# Patient Record
Sex: MALE | Race: ASIAN | NOT HISPANIC OR LATINO | Employment: FULL TIME | URBAN - METROPOLITAN AREA
[De-identification: names, ages, dates, MRNs, and addresses within clinical notes are randomized per-mention and may not be internally consistent; named-entity substitution may affect disease eponyms.]

---

## 2022-07-01 ENCOUNTER — NURSE TRIAGE (OUTPATIENT)
Dept: ADMINISTRATIVE | Facility: CLINIC | Age: 27
End: 2022-07-01
Payer: COMMERCIAL

## 2022-07-01 ENCOUNTER — HOSPITAL ENCOUNTER (EMERGENCY)
Facility: OTHER | Age: 27
Discharge: HOME | End: 2022-07-01
Attending: EMERGENCY MEDICINE
Payer: COMMERCIAL

## 2022-07-01 VITALS
BODY MASS INDEX: 25.77 KG/M2 | DIASTOLIC BLOOD PRESSURE: 70 MMHG | HEART RATE: 81 BPM | TEMPERATURE: 98 F | SYSTOLIC BLOOD PRESSURE: 125 MMHG | OXYGEN SATURATION: 99 % | HEIGHT: 70 IN | WEIGHT: 180 LBS | RESPIRATION RATE: 18 BRPM

## 2022-07-01 DIAGNOSIS — K61.0 PERIANAL ABSCESS: Primary | ICD-10-CM

## 2022-07-01 LAB
ALBUMIN SERPL BCP-MCNC: 3.9 G/DL (ref 3.5–5.2)
ALP SERPL-CCNC: 85 U/L (ref 55–135)
ALT SERPL W/O P-5'-P-CCNC: 16 U/L (ref 10–44)
ANION GAP SERPL CALC-SCNC: 10 MMOL/L (ref 8–16)
AST SERPL-CCNC: 20 U/L (ref 10–40)
BASOPHILS # BLD AUTO: 0.04 K/UL (ref 0–0.2)
BASOPHILS NFR BLD: 0.5 % (ref 0–1.9)
BILIRUB SERPL-MCNC: 0.5 MG/DL (ref 0.1–1)
BUN SERPL-MCNC: 7 MG/DL (ref 6–20)
CALCIUM SERPL-MCNC: 9.1 MG/DL (ref 8.7–10.5)
CHLORIDE SERPL-SCNC: 102 MMOL/L (ref 95–110)
CO2 SERPL-SCNC: 27 MMOL/L (ref 23–29)
CREAT SERPL-MCNC: 1.1 MG/DL (ref 0.5–1.4)
DIFFERENTIAL METHOD: NORMAL
EOSINOPHIL # BLD AUTO: 0.1 K/UL (ref 0–0.5)
EOSINOPHIL NFR BLD: 1.2 % (ref 0–8)
ERYTHROCYTE [DISTWIDTH] IN BLOOD BY AUTOMATED COUNT: 14 % (ref 11.5–14.5)
EST. GFR  (AFRICAN AMERICAN): >60 ML/MIN/1.73 M^2
EST. GFR  (NON AFRICAN AMERICAN): >60 ML/MIN/1.73 M^2
GLUCOSE SERPL-MCNC: 88 MG/DL (ref 70–110)
HCT VFR BLD AUTO: 43.3 % (ref 40–54)
HCV AB SERPL QL IA: NEGATIVE
HGB BLD-MCNC: 14.2 G/DL (ref 14–18)
HIV 1+2 AB+HIV1 P24 AG SERPL QL IA: NEGATIVE
IMM GRANULOCYTES # BLD AUTO: 0.02 K/UL (ref 0–0.04)
IMM GRANULOCYTES NFR BLD AUTO: 0.3 % (ref 0–0.5)
LACTATE SERPL-SCNC: 1.1 MMOL/L (ref 0.5–2.2)
LYMPHOCYTES # BLD AUTO: 1.4 K/UL (ref 1–4.8)
LYMPHOCYTES NFR BLD: 19.2 % (ref 18–48)
MCH RBC QN AUTO: 27.8 PG (ref 27–31)
MCHC RBC AUTO-ENTMCNC: 32.8 G/DL (ref 32–36)
MCV RBC AUTO: 85 FL (ref 82–98)
MONOCYTES # BLD AUTO: 0.8 K/UL (ref 0.3–1)
MONOCYTES NFR BLD: 10.6 % (ref 4–15)
NEUTROPHILS # BLD AUTO: 5 K/UL (ref 1.8–7.7)
NEUTROPHILS NFR BLD: 68.2 % (ref 38–73)
NRBC BLD-RTO: 0 /100 WBC
PLATELET # BLD AUTO: 293 K/UL (ref 150–450)
PMV BLD AUTO: 9.6 FL (ref 9.2–12.9)
POTASSIUM SERPL-SCNC: 4 MMOL/L (ref 3.5–5.1)
PROT SERPL-MCNC: 8.1 G/DL (ref 6–8.4)
RBC # BLD AUTO: 5.1 M/UL (ref 4.6–6.2)
SODIUM SERPL-SCNC: 139 MMOL/L (ref 136–145)
WBC # BLD AUTO: 7.28 K/UL (ref 3.9–12.7)

## 2022-07-01 PROCEDURE — 25000003 PHARM REV CODE 250: Performed by: STUDENT IN AN ORGANIZED HEALTH CARE EDUCATION/TRAINING PROGRAM

## 2022-07-01 PROCEDURE — 99285 EMERGENCY DEPT VISIT HI MDM: CPT | Mod: 25

## 2022-07-01 PROCEDURE — 80053 COMPREHEN METABOLIC PANEL: CPT | Performed by: STUDENT IN AN ORGANIZED HEALTH CARE EDUCATION/TRAINING PROGRAM

## 2022-07-01 PROCEDURE — 85025 COMPLETE CBC W/AUTO DIFF WBC: CPT | Performed by: STUDENT IN AN ORGANIZED HEALTH CARE EDUCATION/TRAINING PROGRAM

## 2022-07-01 PROCEDURE — 86803 HEPATITIS C AB TEST: CPT | Performed by: EMERGENCY MEDICINE

## 2022-07-01 PROCEDURE — 87389 HIV-1 AG W/HIV-1&-2 AB AG IA: CPT | Performed by: EMERGENCY MEDICINE

## 2022-07-01 PROCEDURE — 46050 I&D PERIANAL ABSCESS SUPFC: CPT

## 2022-07-01 PROCEDURE — 25500020 PHARM REV CODE 255: Performed by: EMERGENCY MEDICINE

## 2022-07-01 PROCEDURE — 83605 ASSAY OF LACTIC ACID: CPT | Performed by: STUDENT IN AN ORGANIZED HEALTH CARE EDUCATION/TRAINING PROGRAM

## 2022-07-01 RX ORDER — ACETAMINOPHEN 325 MG/1
650 TABLET ORAL
Status: COMPLETED | OUTPATIENT
Start: 2022-07-01 | End: 2022-07-01

## 2022-07-01 RX ORDER — IBUPROFEN 600 MG/1
600 TABLET ORAL
Status: COMPLETED | OUTPATIENT
Start: 2022-07-01 | End: 2022-07-01

## 2022-07-01 RX ORDER — AMOXICILLIN AND CLAVULANATE POTASSIUM 875; 125 MG/1; MG/1
1 TABLET, FILM COATED ORAL 2 TIMES DAILY
Qty: 9 TABLET | Refills: 0 | Status: ON HOLD | OUTPATIENT
Start: 2022-07-01 | End: 2023-03-21 | Stop reason: HOSPADM

## 2022-07-01 RX ORDER — AMOXICILLIN AND CLAVULANATE POTASSIUM 875; 125 MG/1; MG/1
1 TABLET, FILM COATED ORAL
Status: COMPLETED | OUTPATIENT
Start: 2022-07-01 | End: 2022-07-01

## 2022-07-01 RX ORDER — AMOXICILLIN AND CLAVULANATE POTASSIUM 875; 125 MG/1; MG/1
1 TABLET, FILM COATED ORAL 2 TIMES DAILY
Qty: 9 TABLET | Refills: 0 | Status: CANCELLED | OUTPATIENT
Start: 2022-07-01

## 2022-07-01 RX ORDER — AMOXICILLIN AND CLAVULANATE POTASSIUM 875; 125 MG/1; MG/1
1 TABLET, FILM COATED ORAL 2 TIMES DAILY
Qty: 9 TABLET | Refills: 0 | Status: SHIPPED | OUTPATIENT
Start: 2022-07-01 | End: 2022-07-01 | Stop reason: SDUPTHER

## 2022-07-01 RX ORDER — LIDOCAINE HYDROCHLORIDE AND EPINEPHRINE 10; 10 MG/ML; UG/ML
10 INJECTION, SOLUTION INFILTRATION; PERINEURAL ONCE
Status: COMPLETED | OUTPATIENT
Start: 2022-07-01 | End: 2022-07-01

## 2022-07-01 RX ADMIN — IBUPROFEN 600 MG: 600 TABLET ORAL at 05:07

## 2022-07-01 RX ADMIN — LIDOCAINE HYDROCHLORIDE AND EPINEPHRINE 10 ML: 10; 10 INJECTION, SOLUTION INFILTRATION; PERINEURAL at 05:07

## 2022-07-01 RX ADMIN — ACETAMINOPHEN 650 MG: 325 TABLET, FILM COATED ORAL at 05:07

## 2022-07-01 RX ADMIN — IOHEXOL 100 ML: 350 INJECTION, SOLUTION INTRAVENOUS at 03:07

## 2022-07-01 RX ADMIN — AMOXICILLIN AND CLAVULANATE POTASSIUM 1 TABLET: 875; 125 TABLET, FILM COATED ORAL at 05:07

## 2022-07-01 NOTE — ED TRIAGE NOTES
Pt presents to the ED w/ c/o perianal abscess x 2 months. Pt states he went to urgent care prior to ED arrival and was told to come to ED to drain site. Pt states abscess has grown larger over 2 months and is more painful. Pt states he has been taking sitz baths and has noticed light drainage of pus from site.

## 2022-07-01 NOTE — ED PROVIDER NOTES
Encounter Date: 7/1/2022       History     Chief Complaint   Patient presents with    Abscess     Pt came to the ed for a perianal abscess, went to  where he was diagnosed     26-year-old male with no past medical history who reports to ED with complaint of perianal abscess.  He states it is for started 2 months ago and has progressively grown larger and more painful.  He states he has been using Sitz bath and preparation H with little relief.  He reports that he is having regular bowel movements.  He believes he may have had a past perianal abscess which resolved spontaneously.  He denies fevers, chills, chest pain, shortness of breath, cough, abdominal pain, dysuria, diarrhea, constipation or any other since he had symptoms.  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         Review of patient's allergies indicates:  No Known Allergies  No past medical history on file.  No past surgical history on file.  No family history on file.     Review of Systems   Constitutional: Negative for chills and fever.   HENT: Negative for congestion and rhinorrhea.    Eyes: Negative for pain and visual disturbance.   Respiratory: Negative for cough and shortness of breath.    Cardiovascular: Negative for chest pain and palpitations.   Gastrointestinal: Negative for abdominal pain, nausea and vomiting.   Genitourinary: Negative for difficulty urinating and dysuria.        Perianal pain with mass   Musculoskeletal: Negative for back pain and myalgias.   Skin: Negative for pallor and rash.   Neurological: Negative for weakness, light-headedness, numbness and headaches.       Physical Exam     Initial Vitals [07/01/22 1320]   BP Pulse Resp Temp SpO2   122/62 79 16 98.1 °F (36.7 °C) 100 %      MAP       --         Physical Exam    Nursing note and vitals reviewed.  Constitutional: He appears well-developed. He is not diaphoretic. No distress.   HENT:   Head: Normocephalic and  atraumatic.   Eyes: Conjunctivae and EOM are normal.   Neck: Neck supple.   Normal range of motion.  Cardiovascular: Normal rate, regular rhythm, normal heart sounds and intact distal pulses.   Pulmonary/Chest: Breath sounds normal. No respiratory distress.   Abdominal: Abdomen is soft. He exhibits no distension. There is no abdominal tenderness.   Musculoskeletal:         General: No tenderness or edema. Normal range of motion.      Cervical back: Normal range of motion and neck supple.     Neurological: He is alert and oriented to person, place, and time. He has normal strength. No sensory deficit.   Skin: Skin is warm and dry. Capillary refill takes less than 2 seconds.   Approximately 3 cm mildly erythematous fluctuant mass with minimal induration on left buttock inferior to level of anus         ED Course   I & D - Incision and Drainage    Date/Time: 7/1/2022 5:55 PM  Location procedure was performed: [facility] EMERGENCY DEPARTMENT  Performed by: Mary Goncalves MD  Authorized by: Clayton Colmenares MD   Consent Done: Yes  Consent: Verbal consent obtained.  Risks and benefits: risks, benefits and alternatives were discussed  Type: abscess  Anesthesia: local infiltration    Anesthesia:  Local Anesthetic: lidocaine 1% with epinephrine  Anesthetic total: 10 mL  Scalpel size: 11  Incision type: single straight  Complexity: simple  Drainage: serous  Drainage amount: moderate  Wound treatment: wound packed  Packing material: 1/2 in gauze  Complications: No  Patient tolerance: Patient tolerated the procedure well with no immediate complications        Labs Reviewed   HIV 1 / 2 ANTIBODY    Narrative:     Release to patient->Immediate   HEPATITIS C ANTIBODY    Narrative:     Release to patient->Immediate   CBC W/ AUTO DIFFERENTIAL   COMPREHENSIVE METABOLIC PANEL   LACTIC ACID, PLASMA          Imaging Results           CT Pelvis With Contrast (Final result)  Result time 07/01/22 16:47:30    Final result by Mic Andrea MD  (07/01/22 16:47:30)                 Impression:      3.2 x 0.8 cm rim enhancing collection in the left medial cleft at the 6 o'clock position, consistent with a small perirectal abscess.  No intrapelvic extension.    Distention of the urinary bladder.  This is most likely reactive.    This report was flagged in Epic as abnormal.      Electronically signed by: Mic Andrea MD  Date:    07/01/2022  Time:    16:47             Narrative:    EXAMINATION:  CT PELVIS WITH  CONTRAST    CLINICAL HISTORY:  Anal/rectal abscess;    TECHNIQUE:  Axial CT scan of the pelvis was performed after intravenous administration of 100 cc of Omnipaque 350 IV.  Coronal and sagittal reformats were obtained.    COMPARISON:  None    FINDINGS:  The iliac vessels are unremarkable.  There are multiple enlarged bilateral inguinal lymph nodes.    The visualized bowel loops are unremarkable.  The appendix is fluid filled with no significant periappendiceal inflammatory changes.    The urinary bladder is distended.  There are bilateral scrotal hydroceles.    Within the left medial cleft at the 6 o'clock position, there is a 3.2 x 0.8 cm rim enhancing collection.  No intrapelvic extension is identified.    The osseous structures are unremarkable.                                 Medications   iohexoL (OMNIPAQUE 350) injection 100 mL (100 mLs Intravenous Given 7/1/22 1549)   LIDOcaine-EPINEPHrine 1%-1:100,000 injection 10 mL (10 mLs Intradermal Given by Provider 7/1/22 1708)   amoxicillin-clavulanate 875-125mg per tablet 1 tablet (1 tablet Oral Given 7/1/22 1716)   acetaminophen tablet 650 mg (650 mg Oral Given 7/1/22 1751)   ibuprofen tablet 600 mg (600 mg Oral Given 7/1/22 1751)     Medical Decision Making:   Initial Assessment:   26-year-old male with no past medical history who reports to ED with complaint of perianal abscess.    Differential Diagnosis:   Abscess  Cyst  Fistula  Clinical Tests:   Lab Tests: Ordered and Reviewed  Radiological Study:  Ordered and Reviewed  ED Management:  Patient is hemodynamically stable.  He is nontoxic appearing.  Bedside ultrasound of perianal mass shows a demarcated echogenic region that does not appear to contain any drainable fluid pocket.  Due to proximity to perineum, CT obtained.  CBC, CMP and lactic acid are within normal limits.  CT pelvis demonstrates 3.2 x 0.8 cm rim enhancing collection in the left medial cleft at the 6 o'clock position, consistent with a small perirectal abscess.  No evidence of intrapelvic extension.  Bedside incision and drainage performed without any immediate complications.  Patient given Tylenol and ibuprofen for pain.  Prescription for Augmentin given with first dose administered in the ED.  Patient counseled on proper wound care and follow-up.  Patient verbalized understanding and agreement with plan.  Patient discharged home with return precautions.  All questions answered.                      Clinical Impression:   Final diagnoses:  [K61.0] Perianal abscess (Primary)          ED Disposition Condition    Discharge Stable        ED Prescriptions     Medication Sig Dispense Start Date End Date Auth. Provider    amoxicillin-clavulanate 875-125mg (AUGMENTIN) 875-125 mg per tablet (Expires today) Take 1 tablet by mouth 2 (two) times daily. 9 tablet 7/1/2022 7/1/2022 Mary Goncalves MD        Follow-up Information    None          Mray Goncalves MD  Resident  07/01/22 [id number]

## 2022-07-01 NOTE — DISCHARGE INSTRUCTIONS
Diagnosis:   1. Perianal abscess      Home Care Instructions:  - Medications: Take Augmentin twice daily for 5 days.     Follow-Up Plan:  - Follow-up with: Primary care doctor within 24-48 hours to have packing removes.   - Additional testing and/or evaluation will be directed by your primary doctor    Return to the Emergency Department for symptoms including but not limited to: worsening symptoms, severe back pain, shortness of breath or chest pain, vomiting with inability to hold down fluids, blood in vomit or poop, fevers greater than 100.4°F, passing out/fainting/unconsciousness, or other concerning symptoms.

## 2022-07-02 NOTE — TELEPHONE ENCOUNTER
Patient seen in the ED today and prescribed Augmentin. The pharmacy that the prescription was sent to is permanently closed. Patient would like the prescritpiton transferred to [id number], [address] [place] [address]. Patient advised to call back with any questions.   Reason for Disposition   [1] Prescription prescribed recently is not at pharmacy AND [2] triager has access to patient's EMR AND [3] prescription is recorded in the EMR    Protocols used: MEDICATION QUESTION CALL-A-AH

## 2022-11-08 ENCOUNTER — OFFICE VISIT (OUTPATIENT)
Dept: DERMATOLOGY | Facility: CLINIC | Age: 27
End: 2022-11-08
Payer: COMMERCIAL

## 2022-11-08 DIAGNOSIS — B36.0 TINEA VERSICOLOR: Primary | ICD-10-CM

## 2022-11-08 PROCEDURE — 1159F PR MEDICATION LIST DOCUMENTED IN MEDICAL RECORD: ICD-10-PCS | Mod: CPTII,S$GLB,, | Performed by: STUDENT IN AN ORGANIZED HEALTH CARE EDUCATION/TRAINING PROGRAM

## 2022-11-08 PROCEDURE — 1159F MED LIST DOCD IN RCRD: CPT | Mod: CPTII,S$GLB,, | Performed by: STUDENT IN AN ORGANIZED HEALTH CARE EDUCATION/TRAINING PROGRAM

## 2022-11-08 PROCEDURE — 99203 PR OFFICE/OUTPT VISIT, NEW, LEVL III, 30-44 MIN: ICD-10-PCS | Mod: S$GLB,,, | Performed by: STUDENT IN AN ORGANIZED HEALTH CARE EDUCATION/TRAINING PROGRAM

## 2022-11-08 PROCEDURE — 1160F RVW MEDS BY RX/DR IN RCRD: CPT | Mod: CPTII,S$GLB,, | Performed by: STUDENT IN AN ORGANIZED HEALTH CARE EDUCATION/TRAINING PROGRAM

## 2022-11-08 PROCEDURE — 99203 OFFICE O/P NEW LOW 30 MIN: CPT | Mod: S$GLB,,, | Performed by: STUDENT IN AN ORGANIZED HEALTH CARE EDUCATION/TRAINING PROGRAM

## 2022-11-08 PROCEDURE — 1160F PR REVIEW ALL MEDS BY PRESCRIBER/CLIN PHARMACIST DOCUMENTED: ICD-10-PCS | Mod: CPTII,S$GLB,, | Performed by: STUDENT IN AN ORGANIZED HEALTH CARE EDUCATION/TRAINING PROGRAM

## 2022-11-08 PROCEDURE — 99999 PR PBB SHADOW E&M-EST. PATIENT-LVL II: CPT | Mod: PBBFAC,,, | Performed by: STUDENT IN AN ORGANIZED HEALTH CARE EDUCATION/TRAINING PROGRAM

## 2022-11-08 PROCEDURE — 99999 PR PBB SHADOW E&M-EST. PATIENT-LVL II: ICD-10-PCS | Mod: PBBFAC,,, | Performed by: STUDENT IN AN ORGANIZED HEALTH CARE EDUCATION/TRAINING PROGRAM

## 2022-11-08 RX ORDER — FLUCONAZOLE 200 MG/1
200 TABLET ORAL WEEKLY
Qty: 2 TABLET | Refills: 0 | Status: SHIPPED | OUTPATIENT
Start: 2022-11-08 | End: 2022-11-16

## 2022-11-08 RX ORDER — CICLOPIROX OLAMINE 7.7 MG/G
CREAM TOPICAL 2 TIMES DAILY
Qty: 90 G | Refills: 1 | Status: ON HOLD | OUTPATIENT
Start: 2022-11-08 | End: 2023-03-21 | Stop reason: HOSPADM

## 2022-11-08 NOTE — PROGRESS NOTES
Subjective:       Patient ID:  Kt Quispe is a 27 y.o. male who presents for   Chief Complaint   Patient presents with    Rash     Chest     Rash - Initial  Affected locations: chest  Duration: 8 years  Signs / symptoms: dryness, itching, scaling, redness and growing  Aggravated by: nothing  Relieving factors/Treatments tried: Rx topical steroids and OTC moisturizer  Improvement on treatment: no relief    Review of Systems   Skin:  Positive for itching, rash and dry skin.      Objective:    Physical Exam   Constitutional: He appears well-developed and well-nourished. No distress.   Neurological: He is alert and oriented to person, place, and time. He is not disoriented.   Psychiatric: He has a normal mood and affect.   Skin:   Areas Examined (abnormalities noted in diagram):   Chest / Axilla Inspection Performed  Abdomen Inspection Performed  Back Inspection Performed  RUE Inspected  LUE Inspection Performed            Diagram Legend     Erythematous scaling macule/papule c/w actinic keratosis       Vascular papule c/w angioma      Pigmented verrucoid papule/plaque c/w seborrheic keratosis      Yellow umbilicated papule c/w sebaceous hyperplasia      Irregularly shaped tan macule c/w lentigo     1-2 mm smooth white papules consistent with Milia      Movable subcutaneous cyst with punctum c/w epidermal inclusion cyst      Subcutaneous movable cyst c/w pilar cyst      Firm pink to brown papule c/w dermatofibroma      Pedunculated fleshy papule(s) c/w skin tag(s)      Evenly pigmented macule c/w junctional nevus     Mildly variegated pigmented, slightly irregular-bordered macule c/w mildly atypical nevus      Flesh colored to evenly pigmented papule c/w intradermal nevus       Pink pearly papule/plaque c/w basal cell carcinoma      Erythematous hyperkeratotic cursted plaque c/w SCC      Surgical scar with no sign of skin cancer recurrence      Open and closed comedones      Inflammatory papules and pustules       Verrucoid papule consistent consistent with wart     Erythematous eczematous patches and plaques     Dystrophic onycholytic nail with subungual debris c/w onychomycosis     Umbilicated papule    Erythematous-base heme-crusted tan verrucoid plaque consistent with inflamed seborrheic keratosis     Erythematous Silvery Scaling Plaque c/w Psoriasis     See annotation      Assessment / Plan:        Erythematous scaly plaques in central chest x 8 years. Ddx is broad and morphology somewhat nonspecific--includes tinea versicolor, tinea corporis, atopic derm, seb derm, psoriasis,e tc. Will treat empirically for tinea versicolor vs tinea corporis. If not improving, then will add a topical CS vs consider a biopsy to further characterize  -     ciclopirox (LOPROX) 0.77 % Crea; Apply topically 2 (two) times daily.  Dispense: 90 g; Refill: 1  -     fluconazole (DIFLUCAN) 200 MG Tab; Take 1 tablet (200 mg total) by mouth once a week. for 2 doses  Dispense: 2 tablet; Refill: 0           Follow up in about 4 weeks (around 12/6/2022).

## 2023-03-01 ENCOUNTER — PATIENT MESSAGE (OUTPATIENT)
Dept: ENDOSCOPY | Facility: HOSPITAL | Age: 28
End: 2023-03-01
Payer: COMMERCIAL

## 2023-03-03 ENCOUNTER — OFFICE VISIT (OUTPATIENT)
Dept: GASTROENTEROLOGY | Facility: CLINIC | Age: 28
End: 2023-03-03
Payer: COMMERCIAL

## 2023-03-03 DIAGNOSIS — K52.9 CHRONIC DIARRHEA: Primary | ICD-10-CM

## 2023-03-03 PROCEDURE — 1159F MED LIST DOCD IN RCRD: CPT | Mod: CPTII,95,, | Performed by: INTERNAL MEDICINE

## 2023-03-03 PROCEDURE — 1159F PR MEDICATION LIST DOCUMENTED IN MEDICAL RECORD: ICD-10-PCS | Mod: CPTII,95,, | Performed by: INTERNAL MEDICINE

## 2023-03-03 PROCEDURE — 1160F PR REVIEW ALL MEDS BY PRESCRIBER/CLIN PHARMACIST DOCUMENTED: ICD-10-PCS | Mod: CPTII,95,, | Performed by: INTERNAL MEDICINE

## 2023-03-03 PROCEDURE — 99204 OFFICE O/P NEW MOD 45 MIN: CPT | Mod: 95,,, | Performed by: INTERNAL MEDICINE

## 2023-03-03 PROCEDURE — 99204 PR OFFICE/OUTPT VISIT, NEW, LEVL IV, 45-59 MIN: ICD-10-PCS | Mod: 95,,, | Performed by: INTERNAL MEDICINE

## 2023-03-03 PROCEDURE — 1160F RVW MEDS BY RX/DR IN RCRD: CPT | Mod: CPTII,95,, | Performed by: INTERNAL MEDICINE

## 2023-03-03 NOTE — PROGRESS NOTES
The patient location is: Home  The chief complaint leading to consultation is: Diarrhea    Visit type: audiovisual    Face to Face time with patient: 30 minutes of total time spent on the encounter, which includes face to face time and non-face to face time preparing to see the patient (eg, review of tests), Obtaining and/or reviewing separately obtained history, Documenting clinical information in the electronic or other health record, Independently interpreting results (not separately reported) and communicating results to the patient/family/caregiver, or Care coordination (not separately reported).         Each patient to whom he or she provides medical services by telemedicine is:  (1) informed of the relationship between the physician and patient and the respective role of any other health care provider with respect to management of the patient; and (2) notified that he or she may decline to receive medical services by telemedicine and may withdraw from such care at any time.    Notes:  is a 27 year old gentleman with diarrhea for a year. History of perianal abscess in 2022. Diarrhea used to happen intermittently with normal solid stools in between, however for the past 6 months or so he has been having liquidy stools 3-4 times a day. Not having nocturnal diarrhea. No abdominal pains. Has noted blood in stool a few times. No joint pains, aphthous ulcers, painful red eyes. Brother has Crohn's disease on a biologic. Denies any dysphagia, odynophagia, nausea, vomiting, heartburn or acid regurgitation. No unintentional weight loss. No melena or maroon stools. No recent changes in diet or medications. No family history of Celiac disease or GI malignancy. No regular NSAIDs, alcohol or tobacco use. No recent antibiotic use, travels or sick contacts. No prior history of C.diff. Took Augmentin last year.    Past medical, surgical, social and family history reviewed in epic    Medication allergies reviewed in  epic    Review of systems:    Constitutional:  No fever, no chills, no weight loss, appetite is normal  Eyes:  No visual changes or red eyes  ENT:  No odynophagia or hoarseness of voice  Cardiovascular:  No angina or palpitation  Respiratory:  No shortness breath or wheezing  Genitourinary:  No dysuria or frequency  Musculoskeletal:  No myalgias or arthralgias  Skin:  No pruritus or eczema  Neurologic:  No headache or seizures  Psychiatric:  No anxiety depression  Gastrointestinal:  See HPI    Physical exam:  Virtual visit    Recent lab, endoscopy and imaging reports reviewed    Impression: Chronic diarrhea- Suspect IBD    Recommendations:     Clostridium difficile, Calprotectin  EGD and Colonoscopy  CBC, CMP, CRP

## 2023-03-09 ENCOUNTER — LAB VISIT (OUTPATIENT)
Dept: LAB | Facility: HOSPITAL | Age: 28
End: 2023-03-09
Attending: INTERNAL MEDICINE
Payer: COMMERCIAL

## 2023-03-09 DIAGNOSIS — K52.9 CHRONIC DIARRHEA: ICD-10-CM

## 2023-03-09 DIAGNOSIS — K52.9 CHRONIC DIARRHEA: Primary | ICD-10-CM

## 2023-03-09 LAB
ALBUMIN SERPL BCP-MCNC: 3.8 G/DL (ref 3.5–5.2)
ALP SERPL-CCNC: 82 U/L (ref 55–135)
ALT SERPL W/O P-5'-P-CCNC: 13 U/L (ref 10–44)
ANION GAP SERPL CALC-SCNC: 12 MMOL/L (ref 8–16)
AST SERPL-CCNC: 19 U/L (ref 10–40)
BASOPHILS # BLD AUTO: 0.04 K/UL (ref 0–0.2)
BASOPHILS NFR BLD: 0.6 % (ref 0–1.9)
BILIRUB SERPL-MCNC: 0.6 MG/DL (ref 0.1–1)
BUN SERPL-MCNC: 10 MG/DL (ref 6–20)
CALCIUM SERPL-MCNC: 9.3 MG/DL (ref 8.7–10.5)
CHLORIDE SERPL-SCNC: 102 MMOL/L (ref 95–110)
CO2 SERPL-SCNC: 23 MMOL/L (ref 23–29)
CREAT SERPL-MCNC: 1.1 MG/DL (ref 0.5–1.4)
CRP SERPL-MCNC: 23.7 MG/L (ref 0–8.2)
DIFFERENTIAL METHOD: ABNORMAL
EOSINOPHIL # BLD AUTO: 0.1 K/UL (ref 0–0.5)
EOSINOPHIL NFR BLD: 1.5 % (ref 0–8)
ERYTHROCYTE [DISTWIDTH] IN BLOOD BY AUTOMATED COUNT: 14.6 % (ref 11.5–14.5)
EST. GFR  (NO RACE VARIABLE): >60 ML/MIN/1.73 M^2
GLUCOSE SERPL-MCNC: 81 MG/DL (ref 70–110)
HCT VFR BLD AUTO: 44.6 % (ref 40–54)
HGB BLD-MCNC: 13.8 G/DL (ref 14–18)
IMM GRANULOCYTES # BLD AUTO: 0.04 K/UL (ref 0–0.04)
IMM GRANULOCYTES NFR BLD AUTO: 0.6 % (ref 0–0.5)
LYMPHOCYTES # BLD AUTO: 1.7 K/UL (ref 1–4.8)
LYMPHOCYTES NFR BLD: 24.8 % (ref 18–48)
MCH RBC QN AUTO: 27.1 PG (ref 27–31)
MCHC RBC AUTO-ENTMCNC: 30.9 G/DL (ref 32–36)
MCV RBC AUTO: 88 FL (ref 82–98)
MONOCYTES # BLD AUTO: 0.8 K/UL (ref 0.3–1)
MONOCYTES NFR BLD: 12 % (ref 4–15)
NEUTROPHILS # BLD AUTO: 4.2 K/UL (ref 1.8–7.7)
NEUTROPHILS NFR BLD: 60.5 % (ref 38–73)
NRBC BLD-RTO: 0 /100 WBC
PLATELET # BLD AUTO: 374 K/UL (ref 150–450)
PMV BLD AUTO: 10.1 FL (ref 9.2–12.9)
POTASSIUM SERPL-SCNC: 4.1 MMOL/L (ref 3.5–5.1)
PROT SERPL-MCNC: 8.1 G/DL (ref 6–8.4)
RBC # BLD AUTO: 5.1 M/UL (ref 4.6–6.2)
SODIUM SERPL-SCNC: 137 MMOL/L (ref 136–145)
WBC # BLD AUTO: 6.89 K/UL (ref 3.9–12.7)

## 2023-03-09 PROCEDURE — 85025 COMPLETE CBC W/AUTO DIFF WBC: CPT | Performed by: INTERNAL MEDICINE

## 2023-03-09 PROCEDURE — 80053 COMPREHEN METABOLIC PANEL: CPT | Performed by: INTERNAL MEDICINE

## 2023-03-09 PROCEDURE — 86140 C-REACTIVE PROTEIN: CPT | Performed by: INTERNAL MEDICINE

## 2023-03-09 PROCEDURE — 36415 COLL VENOUS BLD VENIPUNCTURE: CPT | Performed by: INTERNAL MEDICINE

## 2023-03-10 ENCOUNTER — TELEPHONE (OUTPATIENT)
Dept: GASTROENTEROLOGY | Facility: CLINIC | Age: 28
End: 2023-03-10
Payer: COMMERCIAL

## 2023-03-10 NOTE — TELEPHONE ENCOUNTER
----- Message from Ulysses Holcomb MD sent at 3/10/2023  7:53 AM CST -----  Blood test shows elevated CRP. C.diff is negative.

## 2023-03-15 ENCOUNTER — TELEPHONE (OUTPATIENT)
Dept: GASTROENTEROLOGY | Facility: CLINIC | Age: 28
End: 2023-03-15
Payer: COMMERCIAL

## 2023-03-15 NOTE — TELEPHONE ENCOUNTER
----- Message from Ulysses Holcomb MD sent at 3/15/2023  3:55 PM CDT -----  Please notify patient, stool inflammatory markers are elevated. C.diff is negative. Will proceed with colonoscopy as planned.

## 2023-03-20 ENCOUNTER — ANESTHESIA EVENT (OUTPATIENT)
Dept: ENDOSCOPY | Facility: HOSPITAL | Age: 28
End: 2023-03-20
Payer: COMMERCIAL

## 2023-03-20 NOTE — ANESTHESIA PREPROCEDURE EVALUATION
03/20/2023  Kt Quispe is a 27 y.o., male.      Pre-op Assessment    I have reviewed the Patient Summary Reports.       I have reviewed the Medications.     Review of Systems  Anesthesia Hx:  Denies Family Hx of Anesthesia complications.   Denies Personal Hx of Anesthesia complications.   Hematology/Oncology:  Hematology Normal   Oncology Normal     EENT/Dental:EENT/Dental Normal   Cardiovascular:  Cardiovascular Normal     Pulmonary:  Pulmonary Normal    Renal/:  Renal/ Normal     Hepatic/GI:  Hepatic/GI Normal    Musculoskeletal:  Musculoskeletal Normal    Neurological:  Neurology Normal    Endocrine:  Endocrine Normal    Dermatological:  Skin Normal    Psych:  Psychiatric Normal           Physical Exam  General: Well nourished, Cooperative, Alert and Oriented    Airway:  Mallampati: I   Mouth Opening: Normal  TM Distance: Normal  Tongue: Normal  Neck ROM: Normal ROM    Dental:  Intact        Anesthesia Plan  Type of Anesthesia, risks & benefits discussed:    Anesthesia Type: Gen Natural Airway  Intra-op Monitoring Plan: Standard ASA Monitors  Post Op Pain Control Plan: multimodal analgesia  Induction:  IV  Informed Consent: Informed consent signed with the Patient and all parties understand the risks and agree with anesthesia plan.  All questions answered.   ASA Score: 1  Day of Surgery Review of History & Physical: H&P Update referred to the surgeon/provider.    Ready For Surgery From Anesthesia Perspective.     .

## 2023-03-21 ENCOUNTER — ANESTHESIA (OUTPATIENT)
Dept: ENDOSCOPY | Facility: HOSPITAL | Age: 28
End: 2023-03-21
Payer: COMMERCIAL

## 2023-03-21 ENCOUNTER — HOSPITAL ENCOUNTER (OUTPATIENT)
Facility: HOSPITAL | Age: 28
Discharge: HOME | End: 2023-03-21
Attending: INTERNAL MEDICINE | Admitting: INTERNAL MEDICINE
Payer: COMMERCIAL

## 2023-03-21 VITALS
DIASTOLIC BLOOD PRESSURE: 65 MMHG | RESPIRATION RATE: 20 BRPM | TEMPERATURE: 98 F | OXYGEN SATURATION: 100 % | HEART RATE: 67 BPM | SYSTOLIC BLOOD PRESSURE: 138 MMHG

## 2023-03-21 DIAGNOSIS — R19.7 DIARRHEA, UNSPECIFIED TYPE: Primary | ICD-10-CM

## 2023-03-21 DIAGNOSIS — R19.7 DIARRHEA: ICD-10-CM

## 2023-03-21 PROCEDURE — 45380 COLONOSCOPY AND BIOPSY: CPT | Performed by: INTERNAL MEDICINE

## 2023-03-21 PROCEDURE — D9220A PRA ANESTHESIA: ICD-10-PCS | Mod: ANES,,, | Performed by: ANESTHESIOLOGY

## 2023-03-21 PROCEDURE — 43239 PR EGD, FLEX, W/BIOPSY, SGL/MULTI: ICD-10-PCS | Mod: 51,,, | Performed by: INTERNAL MEDICINE

## 2023-03-21 PROCEDURE — 82657 ENZYME CELL ACTIVITY: CPT | Performed by: PATHOLOGY

## 2023-03-21 PROCEDURE — 88305 TISSUE EXAM BY PATHOLOGIST: CPT | Mod: 59 | Performed by: PATHOLOGY

## 2023-03-21 PROCEDURE — 37000009 HC ANESTHESIA EA ADD 15 MINS: Performed by: INTERNAL MEDICINE

## 2023-03-21 PROCEDURE — D9220A PRA ANESTHESIA: Mod: ANES,,, | Performed by: ANESTHESIOLOGY

## 2023-03-21 PROCEDURE — 88305 TISSUE EXAM BY PATHOLOGIST: CPT | Mod: 26,,, | Performed by: PATHOLOGY

## 2023-03-21 PROCEDURE — 43239 EGD BIOPSY SINGLE/MULTIPLE: CPT | Mod: 51,,, | Performed by: INTERNAL MEDICINE

## 2023-03-21 PROCEDURE — 43239 EGD BIOPSY SINGLE/MULTIPLE: CPT | Performed by: INTERNAL MEDICINE

## 2023-03-21 PROCEDURE — 88305 TISSUE EXAM BY PATHOLOGIST: ICD-10-PCS | Mod: 26,,, | Performed by: PATHOLOGY

## 2023-03-21 PROCEDURE — 94761 N-INVAS EAR/PLS OXIMETRY MLT: CPT

## 2023-03-21 PROCEDURE — D9220A PRA ANESTHESIA: ICD-10-PCS | Mod: CRNA,,, | Performed by: NURSE ANESTHETIST, CERTIFIED REGISTERED

## 2023-03-21 PROCEDURE — D9220A PRA ANESTHESIA: Mod: CRNA,,, | Performed by: NURSE ANESTHETIST, CERTIFIED REGISTERED

## 2023-03-21 PROCEDURE — 63600175 PHARM REV CODE 636 W HCPCS: Performed by: NURSE ANESTHETIST, CERTIFIED REGISTERED

## 2023-03-21 PROCEDURE — 37000008 HC ANESTHESIA 1ST 15 MINUTES: Performed by: INTERNAL MEDICINE

## 2023-03-21 PROCEDURE — 27201012 HC FORCEPS, HOT/COLD, DISP: Performed by: INTERNAL MEDICINE

## 2023-03-21 PROCEDURE — 99900035 HC TECH TIME PER 15 MIN (STAT)

## 2023-03-21 PROCEDURE — 45380 PR COLONOSCOPY,BIOPSY: ICD-10-PCS | Mod: ,,, | Performed by: INTERNAL MEDICINE

## 2023-03-21 PROCEDURE — 45380 COLONOSCOPY AND BIOPSY: CPT | Mod: ,,, | Performed by: INTERNAL MEDICINE

## 2023-03-21 PROCEDURE — 25000003 PHARM REV CODE 250: Performed by: NURSE ANESTHETIST, CERTIFIED REGISTERED

## 2023-03-21 RX ORDER — SODIUM CHLORIDE 9 MG/ML
INJECTION, SOLUTION INTRAVENOUS CONTINUOUS
Status: DISCONTINUED | OUTPATIENT
Start: 2023-03-21 | End: 2023-03-21 | Stop reason: HOSPADM

## 2023-03-21 RX ORDER — PROPOFOL 10 MG/ML
VIAL (ML) INTRAVENOUS
Status: DISCONTINUED | OUTPATIENT
Start: 2023-03-21 | End: 2023-03-21

## 2023-03-21 RX ORDER — PROPOFOL 10 MG/ML
VIAL (ML) INTRAVENOUS CONTINUOUS PRN
Status: DISCONTINUED | OUTPATIENT
Start: 2023-03-21 | End: 2023-03-21

## 2023-03-21 RX ORDER — LIDOCAINE HYDROCHLORIDE 20 MG/ML
INJECTION INTRAVENOUS
Status: DISCONTINUED | OUTPATIENT
Start: 2023-03-21 | End: 2023-03-21

## 2023-03-21 RX ADMIN — GLYCOPYRROLATE 0.2 MG: 0.2 INJECTION, SOLUTION INTRAMUSCULAR; INTRAVENOUS at 01:03

## 2023-03-21 RX ADMIN — LIDOCAINE HYDROCHLORIDE 100 MG: 20 INJECTION INTRAVENOUS at 02:03

## 2023-03-21 RX ADMIN — PROPOFOL 100 MG: 10 INJECTION, EMULSION INTRAVENOUS at 02:03

## 2023-03-21 RX ADMIN — Medication 250 MCG/KG/MIN: at 02:03

## 2023-03-21 RX ADMIN — SODIUM CHLORIDE: 0.9 INJECTION, SOLUTION INTRAVENOUS at 01:03

## 2023-03-21 NOTE — PLAN OF CARE
Plan of care reviewed. Pt verbalizes understanding. Questions and concerns addressed.     Pt reports eating solid foods yesterday at 1400. Rice beans and chicken. Pt procedure is diagnostic secondary to diarrhea. Will notify MD Holcomb.

## 2023-03-21 NOTE — H&P
Short Stay Endoscopy History and Physical    PCP - Primary Doctor No    Procedure - EGD/Colonoscopy  Sedation: GA  ASA - per anesthesia  Mallampati - per anesthesia  History of Anesthesia problems - no  Family history Anesthesia problems -  no     HPI:  This is a 27 y.o. male here for evaluation of : Chronic diarrhea    Reflux - no  Dysphagia - no  Abdominal pain - no  Diarrhea - yes    ROS:  Constitutional: No fevers, chills, No weight loss  ENT: No allergies  CV: No chest pain  Pulm: No cough, No shortness of breath  Ophtho: No vision changes  GI: see HPI  Medical History:  has no past medical history on file.    Surgical History:  has no past surgical history on file.    Family History: family history is not on file.. Otherwise no colon cancer, inflammatory bowel disease, or GI malignancies.    Social History:  reports that he has never smoked. He has never used smokeless tobacco.    Review of patient's allergies indicates:  No Known Allergies    Medications:   Medications Prior to Admission   Medication Sig Dispense Refill Last Dose    amoxicillin-clavulanate 875-125mg (AUGMENTIN) 875-125 mg per tablet Take 1 tablet by mouth 2 (two) times daily. 9 tablet 0     ciclopirox (LOPROX) 0.77 % Crea Apply topically 2 (two) times daily. 90 g 1        Objective Findings:    Vital Signs: Per nursing notes.    Physical Exam:  General Appearance: Well appearing in no acute distress  Head:   Normocephalic, without obvious abnormality  Eyes:    No scleral icterus  Airway: Open  Neck: No restriction in mobility  Lungs: CTA bilaterally in anterior and posterior fields, no wheezes, no crackles.  Heart:  Regular rate and rhythm, S1, S2 normal, no murmurs heard  Abdomen: Soft, non tender, non distended      Labs:  Lab Results   Component Value Date    WBC 6.89 03/09/2023    HGB 13.8 (L) 03/09/2023    HCT 44.6 03/09/2023     03/09/2023    ALT 13 03/09/2023    AST 19 03/09/2023     03/09/2023    K 4.1 03/09/2023    CL  102 03/09/2023    CREATININE 1.1 03/09/2023    BUN 10 03/09/2023    CO2 23 03/09/2023         I have explained the risks and benefits of endoscopy procedures to the patient including but not limited to bleeding, perforation, infection, and death.    Thank you so much for allowing me to participate in the care of Kt Holcomb MD

## 2023-03-21 NOTE — ANESTHESIA POSTPROCEDURE EVALUATION
Anesthesia Post Evaluation    Patient: Kt Quispe    Procedure(s) Performed: Procedure(s) (LRB):  EGD (ESOPHAGOGASTRODUODENOSCOPY) (N/A)  COLONOSCOPY (N/A)    Final Anesthesia Type: MAC      Patient location during evaluation: PACU  Patient participation: Yes- Able to Participate  Level of consciousness: awake and alert  Post-procedure vital signs: reviewed and stable  Pain management: adequate  Airway patency: patent    PONV status at discharge: No PONV  Anesthetic complications: no      Cardiovascular status: blood pressure returned to baseline  Respiratory status: unassisted and spontaneous ventilation  Hydration status: euvolemic  Follow-up not needed.          Vitals Value Taken Time   /65 03/21/23 1450   Temp 36.6 °C (97.8 °F) 03/21/23 1450   Pulse 82 03/21/23 1450   Resp 26 03/21/23 1450   SpO2 99 % 03/21/23 1450         No case tracking events are documented in the log.      Pain/Karolina Score: Karolina Score: 8 (3/21/2023  2:50 PM)

## 2023-03-21 NOTE — TRANSFER OF CARE
Anesthesia Transfer of Care Note    Patient: Kt Quispe    Procedure(s) Performed: Procedure(s) (LRB):  EGD (ESOPHAGOGASTRODUODENOSCOPY) (N/A)  COLONOSCOPY (N/A)    Patient location: GI    Anesthesia Type: general    Transport from OR: Transported from OR on room air with adequate spontaneous ventilation    Post pain: adequate analgesia    Post assessment: no apparent anesthetic complications and tolerated procedure well    Post vital signs: stable    Level of consciousness: lethargic    Nausea/Vomiting: no nausea/vomiting    Complications: none    Transfer of care protocol was followed      Last vitals:   Visit Vitals  BP (!) 143/65 (Patient Position: Lying)   Pulse 82   Temp 36.6 °C (97.8 °F)   Resp (!) 26   SpO2 99%

## 2023-03-21 NOTE — PROVATION PATIENT INSTRUCTIONS
Discharge Summary/Instructions after an Endoscopic Procedure  Patient Name: Kt Quispe  Patient MRN: [id number]  Patient YOB: 1995 Tuesday, March 21, 2023  Ulysses Holcomb MD  Dear patient,  As a result of recent federal legislation (The Federal Cures Act), you may   receive lab or pathology results from your procedure in your [facility]   account before your physician is able to contact you. Your physician or   their representative will relay the results to you with their   recommendations at their soonest availability.  Thank you,  RESTRICTIONS:  During your procedure today, you received medications for sedation.  These   medications may affect your judgment, balance and coordination.  Therefore,   for 24 hours, you have the following restrictions:   - DO NOT drive a car, operate machinery, make legal/financial decisions,   sign important papers or drink alcohol.    ACTIVITY:  Today: no heavy lifting, straining or running due to procedural   sedation/anesthesia.  The following day: return to full activity including work.  DIET:  Eat and drink normally unless instructed otherwise.     TREATMENT FOR COMMON SIDE EFFECTS:  - Mild abdominal pain, nausea, belching, bloating or excessive gas:  rest,   eat lightly and use a heating pad.  - Sore Throat: treat with throat lozenges and/or gargle with warm salt   water.  - Because air was used during the procedure, expelling large amounts of air   from your rectum or belching is normal.  - If a bowel prep was taken, you may not have a bowel movement for 1-3 days.    This is normal.  SYMPTOMS TO WATCH FOR AND REPORT TO YOUR PHYSICIAN:  1. Abdominal pain or bloating, other than gas cramps.  2. Chest pain.  3. Back pain.  4. Signs of infection such as: chills or fever occurring within 24 hours   after the procedure.  5. Rectal bleeding, which would show as bright red, maroon, or black stools.   (A tablespoon of blood from the rectum is not serious, especially  if   hemorrhoids are present.)  6. Vomiting.  7. Weakness or dizziness.  GO DIRECTLY TO THE NEAREST EMERGENCY ROOM IF YOU HAVE ANY OF THE FOLLOWING:      Difficulty breathing              Chills and/or fever over 101 F   Persistent vomiting and/or vomiting blood   Severe abdominal pain   Severe chest pain   Black, tarry stools   Bleeding- more than one tablespoon   Any other symptom or condition that you feel may need urgent attention  Your doctor recommends these additional instructions:  If any biopsies were taken, your doctors clinic will contact you in 1 to 2   weeks with any results.  - Patient has a contact number available for emergencies.  The signs and   symptoms of potential delayed complications were discussed with the   patient.  Return to normal activities tomorrow.  Written discharge   instructions were provided to the patient.   - Discharge patient to home.   - Resume previous diet.   - Continue present medications.   - Await pathology results.   For questions, problems or results please call your physician - Ulysses Holcomb MD at Work:  [phone].  [facility] [place], EMERGENCY ROOM PHONE NUMBER: [phone]  IF A COMPLICATION OR EMERGENCY SITUATION ARISES AND YOU ARE UNABLE TO REACH   YOUR PHYSICIAN - GO DIRECTLY TO THE EMERGENCY ROOM.  Ulysses Holcomb MD  3/21/2023 2:13:02 PM  This report has been verified and signed electronically.  Dear patient,  As a result of recent federal legislation (The Federal Cures Act), you may   receive lab or pathology results from your procedure in your [facility]   account before your physician is able to contact you. Your physician or   their representative will relay the results to you with their   recommendations at their soonest availability.  Thank you,  PROVATION

## 2023-03-22 ENCOUNTER — TELEPHONE (OUTPATIENT)
Dept: GASTROENTEROLOGY | Facility: CLINIC | Age: 28
End: 2023-03-22
Payer: COMMERCIAL

## 2023-03-22 NOTE — TELEPHONE ENCOUNTER
Spoke to patient had scope yesterday and got diagnosis with Crohn's   -explained new pt process will reach back out with an appointment at future date

## 2023-03-23 ENCOUNTER — TELEPHONE (OUTPATIENT)
Dept: GASTROENTEROLOGY | Facility: CLINIC | Age: 28
End: 2023-03-23
Payer: COMMERCIAL

## 2023-03-23 ENCOUNTER — TELEPHONE (OUTPATIENT)
Dept: ENDOSCOPY | Facility: HOSPITAL | Age: 28
End: 2023-03-23

## 2023-03-23 NOTE — TELEPHONE ENCOUNTER
----- Message from Mary Hines sent at 3/23/2023  1:01 PM CDT -----  Kt Quispe calling regarding Patient Advice for a missed call from the nurse to schedule the PT, call back [phone]

## 2023-03-27 LAB
FINAL PATHOLOGIC DIAGNOSIS: NORMAL
Lab: NORMAL

## 2023-03-28 ENCOUNTER — TELEPHONE (OUTPATIENT)
Dept: GASTROENTEROLOGY | Facility: CLINIC | Age: 28
End: 2023-03-28
Payer: COMMERCIAL

## 2023-03-28 ENCOUNTER — PATIENT MESSAGE (OUTPATIENT)
Dept: GASTROENTEROLOGY | Facility: CLINIC | Age: 28
End: 2023-03-28
Payer: COMMERCIAL

## 2023-03-28 LAB
FINAL PATHOLOGIC DIAGNOSIS: NORMAL
GROSS: NORMAL
Lab: NORMAL

## 2023-03-28 NOTE — TELEPHONE ENCOUNTER
----- Message from Ulysses Holocmb MD sent at 3/28/2023  9:44 AM CDT -----  Biopsies reveal lactose intolerance. Please take chewable Lactaid tablets with dairy products (milk, ice cream, butter, cheese, cream, yogurt etc).

## 2023-03-30 ENCOUNTER — PATIENT MESSAGE (OUTPATIENT)
Dept: GASTROENTEROLOGY | Facility: CLINIC | Age: 28
End: 2023-03-30
Payer: COMMERCIAL

## 2023-04-14 ENCOUNTER — PATIENT MESSAGE (OUTPATIENT)
Dept: GASTROENTEROLOGY | Facility: CLINIC | Age: 28
End: 2023-04-14
Payer: COMMERCIAL

## 2023-04-14 RX ORDER — OMEPRAZOLE 40 MG/1
40 CAPSULE, DELAYED RELEASE ORAL
Qty: 20 CAPSULE | Refills: 0 | Status: SHIPPED | OUTPATIENT
Start: 2023-04-14 | End: 2023-06-28

## 2023-04-14 RX ORDER — BISMUTH SUBCITRATE POTASSIUM, METRONIDAZOLE AND TETRACYCLINE HYDROCHLORIDE 140; 125; 125 MG/1; MG/1; MG/1
3 CAPSULE ORAL
Qty: 120 CAPSULE | Refills: 0 | Status: SHIPPED | OUTPATIENT
Start: 2023-04-14 | End: 2023-04-24

## 2023-06-27 ENCOUNTER — PATIENT MESSAGE (OUTPATIENT)
Dept: PRIMARY CARE CLINIC | Facility: CLINIC | Age: 28
End: 2023-06-27

## 2023-06-27 ENCOUNTER — OFFICE VISIT (OUTPATIENT)
Dept: PRIMARY CARE CLINIC | Facility: CLINIC | Age: 28
End: 2023-06-27
Payer: COMMERCIAL

## 2023-06-27 DIAGNOSIS — R30.0 DYSURIA: Primary | ICD-10-CM

## 2023-06-27 PROCEDURE — 99213 PR OFFICE/OUTPT VISIT, EST, LEVL III, 20-29 MIN: ICD-10-PCS | Mod: 95,,, | Performed by: STUDENT IN AN ORGANIZED HEALTH CARE EDUCATION/TRAINING PROGRAM

## 2023-06-27 PROCEDURE — 99213 OFFICE O/P EST LOW 20 MIN: CPT | Mod: 95,,, | Performed by: STUDENT IN AN ORGANIZED HEALTH CARE EDUCATION/TRAINING PROGRAM

## 2023-06-27 NOTE — PROGRESS NOTES
Telehealth Visit    The patient location is: [place]   The chief complaint leading to consultation is: Pain urination    Visit type: audiovisual    Face to Face time with patient: 3 minutes   12 minutes of total time spent on the encounter, which includes face to face time and non-face to face time preparing to see the patient (eg, review of tests), Obtaining and/or reviewing separately obtained history, Documenting clinical information in the electronic or other health record, Independently interpreting results (not separately reported) and communicating results to the patient/family/caregiver, or Care coordination (not separately reported).       HPI    Patient is a 27 y.o.   Kt Quispe  has no past medical history on file.    Patient reports painful urination   Recently has new partner   He would like STI screening and to establish care       Social History     Social History Narrative    Not on file     Kt Quispe family history is not on file.    Active Medications:  Review of patient's allergies indicates:  No Known Allergies  Current Outpatient Medications   Medication Instructions    omeprazole (PRILOSEC) 40 mg, Oral, 2 times daily before meals       Physical Exam    General: Does not appear to be in acute distress    Assessment and Plan     Dysuria   F/u UA and STI screening   Recommended in person appointment to establish care/empiric treatment         Orders Placed This Visit  Orders Placed This Encounter   Procedures    C. trachomatis/N. gonorrhoeae by AMP [facility]; Urine     Standing Status:   Future     Standing Expiration Date:   8/25/2024     Order Specific Question:   Sources by Resulting Lab:     Answer:   [facility]     Order Specific Question:   Source:     Answer:   Urine     Order Specific Question:   Release to patient     Answer:   Immediate    Urinalysis, Reflex to Urine Culture Urine, Clean Catch     Standing Status:   Future     Standing Expiration Date:   8/25/2024     Order  Specific Question:   Preferred Collection Type     Answer:   Urine, Clean Catch     Order Specific Question:   Specimen Source     Answer:   Urine           Upcoming Scheduled Appointments and Follow Up:    Future Appointments   Date Time Provider Department Center   6/28/2023  2:30 PM Linda Peter MD [facility] DER[facility] [place]   7/12/2023  9:30 AM Ashley Azevedo MD [facility]       Follow Up DG/[facility] Care (with who? when?): Follow up in about 1 week (around 7/4/2023) for [facility] care .        No emergency contact information on file.      Ashley Azevedo MD   Attending Physician  Primary Care  6/27/2023 - 9:10 AM        Each patient to whom he or she provides medical services by telemedicine is:  (1) informed of the relationship between the physician and patient and the respective role of any other health care provider with respect to management of the patient; and (2) notified that he or she may decline to receive medical services by telemedicine and may withdraw from such care at any time.  Answers submitted by the patient for this visit:  Painful Urination Questionnaire (Submitted on 6/27/2023)  Chief Complaint: Dysuria  Chronicity: new  Onset: in the past 7 days  Frequency: every urination  Progression since onset: waxing and waning  Pain quality: burning, shooting  Pain - numeric: 6/10  Fever: no fever  Fever duration: less than 1 day  Sexually active?: Yes  History of pyelonephritis?: No  chills: No  discharge: Yes  flank pain: No  frequency: No  hematuria: No  hesitancy: No  nausea: No  possible pregnancy: No  sweats: No  urgency: No  vomiting: No  constipation: No  rash: Yes  weight loss: No  withholding: No  behavior changes: No  Treatments tried: nothing  Improvement on treatment: no relief  Pain severity: mild  catheterization: No  diabetes insipidus: No  diabetes mellitus: No  genitourinary reflux: No  hypertension: No  recurrent UTIs: No  single kidney: No  STD: No  urinary stasis:  No  urological procedure: No  kidney stones: No

## 2023-06-28 ENCOUNTER — OFFICE VISIT (OUTPATIENT)
Dept: DERMATOLOGY | Facility: CLINIC | Age: 28
End: 2023-06-28
Payer: COMMERCIAL

## 2023-06-28 VITALS — BODY MASS INDEX: 25.83 KG/M2 | WEIGHT: 180 LBS

## 2023-06-28 DIAGNOSIS — L70.9 ACNE, UNSPECIFIED ACNE TYPE: ICD-10-CM

## 2023-06-28 DIAGNOSIS — L30.4 INTERTRIGO: ICD-10-CM

## 2023-06-28 DIAGNOSIS — L81.0 POST-INFLAMMATORY HYPERPIGMENTATION: Primary | ICD-10-CM

## 2023-06-28 PROCEDURE — 99214 OFFICE O/P EST MOD 30 MIN: CPT | Mod: S$GLB,,, | Performed by: DERMATOLOGY

## 2023-06-28 PROCEDURE — 1160F RVW MEDS BY RX/DR IN RCRD: CPT | Mod: CPTII,S$GLB,, | Performed by: DERMATOLOGY

## 2023-06-28 PROCEDURE — 1159F PR MEDICATION LIST DOCUMENTED IN MEDICAL RECORD: ICD-10-PCS | Mod: CPTII,S$GLB,, | Performed by: DERMATOLOGY

## 2023-06-28 PROCEDURE — 99214 PR OFFICE/OUTPT VISIT, EST, LEVL IV, 30-39 MIN: ICD-10-PCS | Mod: S$GLB,,, | Performed by: DERMATOLOGY

## 2023-06-28 PROCEDURE — 99999 PR PBB SHADOW E&M-EST. PATIENT-LVL III: ICD-10-PCS | Mod: PBBFAC,,, | Performed by: DERMATOLOGY

## 2023-06-28 PROCEDURE — 99999 PR PBB SHADOW E&M-EST. PATIENT-LVL III: CPT | Mod: PBBFAC,,, | Performed by: DERMATOLOGY

## 2023-06-28 PROCEDURE — 3008F BODY MASS INDEX DOCD: CPT | Mod: CPTII,S$GLB,, | Performed by: DERMATOLOGY

## 2023-06-28 PROCEDURE — 3008F PR BODY MASS INDEX (BMI) DOCUMENTED: ICD-10-PCS | Mod: CPTII,S$GLB,, | Performed by: DERMATOLOGY

## 2023-06-28 PROCEDURE — 1159F MED LIST DOCD IN RCRD: CPT | Mod: CPTII,S$GLB,, | Performed by: DERMATOLOGY

## 2023-06-28 PROCEDURE — 1160F PR REVIEW ALL MEDS BY PRESCRIBER/CLIN PHARMACIST DOCUMENTED: ICD-10-PCS | Mod: CPTII,S$GLB,, | Performed by: DERMATOLOGY

## 2023-06-28 RX ORDER — CLOBETASOL PROPIONATE 0.5 MG/G
1 CREAM TOPICAL 2 TIMES DAILY
COMMUNITY
Start: 2023-01-21

## 2023-06-28 RX ORDER — TACROLIMUS 1 MG/G
OINTMENT TOPICAL 2 TIMES DAILY
Qty: 60 G | Refills: 3 | Status: SHIPPED | OUTPATIENT
Start: 2023-06-28 | End: 2023-11-30 | Stop reason: SDUPTHER

## 2023-06-28 RX ORDER — CLINDAMYCIN PHOSPHATE 11.9 MG/ML
1 SOLUTION TOPICAL 2 TIMES DAILY
COMMUNITY
Start: 2023-01-21

## 2023-06-28 RX ORDER — HYDROCORTISONE ACETATE 25 MG/1
SUPPOSITORY RECTAL
COMMUNITY
Start: 2023-01-21

## 2023-06-28 RX ORDER — FLUCONAZOLE 150 MG/1
150 TABLET ORAL
COMMUNITY
Start: 2023-03-16

## 2023-06-28 RX ORDER — POLYETHYLENE GLYCOL-3350 AND ELECTROLYTES WITH FLAVOR PACK 240; 5.84; 2.98; 6.72; 22.72 G/278.26G; G/278.26G; G/278.26G; G/278.26G; G/278.26G
4000 POWDER, FOR SOLUTION ORAL ONCE
COMMUNITY
Start: 2023-03-09

## 2023-06-28 NOTE — PROGRESS NOTES
Subjective:      Patient ID:  Kt Quispe is a 27 y.o. male who presents for   Chief Complaint   Patient presents with    Rash     Recurrent on chest , groin area, several months, itching      Rash on chest for several years the diflucan and did not help.  Also rash groin area which itches.     Rash - Initial  Affected locations: groin and chest  Signs / symptoms: itching    Review of Systems   Constitutional:  Negative for fever, chills, weight loss, weight gain, fatigue, night sweats and malaise.   Skin:  Positive for rash. Negative for daily sunscreen use, activity-related sunscreen use and wears hat.   Hematologic/Lymphatic: Does not bruise/bleed easily.     Objective:   Physical Exam   Constitutional: He appears well-developed and well-nourished.   Neurological: He is alert and oriented to person, place, and time.   Psychiatric: He has a normal mood and affect.   Skin:   Areas Examined (abnormalities noted in diagram):   Chest / Axilla Inspection Performed  Abdomen Inspection Performed  Back Inspection Performed  RUE Inspected  LUE Inspection Performed          Diagram Legend     Erythematous scaling macule/papule c/w actinic keratosis       Vascular papule c/w angioma      Pigmented verrucoid papule/plaque c/w seborrheic keratosis      Yellow umbilicated papule c/w sebaceous hyperplasia      Irregularly shaped tan macule c/w lentigo     1-2 mm smooth white papules consistent with Milia      Movable subcutaneous cyst with punctum c/w epidermal inclusion cyst      Subcutaneous movable cyst c/w pilar cyst      Firm pink to brown papule c/w dermatofibroma      Pedunculated fleshy papule(s) c/w skin tag(s)      Evenly pigmented macule c/w junctional nevus     Mildly variegated pigmented, slightly irregular-bordered macule c/w mildly atypical nevus      Flesh colored to evenly pigmented papule c/w intradermal nevus       Pink pearly papule/plaque c/w basal cell carcinoma      Erythematous hyperkeratotic cursted  plaque c/w SCC      Surgical scar with no sign of skin cancer recurrence      Open and closed comedones      Inflammatory papules and pustules      Verrucoid papule consistent consistent with wart     Erythematous eczematous patches and plaques     Dystrophic onycholytic nail with subungual debris c/w onychomycosis     Umbilicated papule    Erythematous-base heme-crusted tan verrucoid plaque consistent with inflamed seborrheic keratosis     Erythematous Silvery Scaling Plaque c/w Psoriasis     See annotation      Assessment / Plan:        Post-inflammatory hyperpigmentation most likely  -     tacrolimus (PROTOPIC) 0.1 % ointment; Apply topically 2 (two) times daily.  Dispense: 60 g; Refill: 3  Call if no better and can do bx     Intertrigo  Vinegar and water 1/2 each hs  Dermeleve cream for pruritus     Acne  (back)  Cerave BP wash in shower           Follow up if symptoms worsen or fail to improve.

## 2023-07-03 ENCOUNTER — PATIENT MESSAGE (OUTPATIENT)
Dept: DERMATOLOGY | Facility: CLINIC | Age: 28
End: 2023-07-03
Payer: COMMERCIAL

## 2023-11-30 DIAGNOSIS — L81.0 POST-INFLAMMATORY HYPERPIGMENTATION: ICD-10-CM

## 2023-12-01 RX ORDER — TACROLIMUS 1 MG/G
OINTMENT TOPICAL 2 TIMES DAILY
Qty: 60 G | Refills: 3 | Status: SHIPPED | OUTPATIENT
Start: 2023-12-01

## 2023-12-20 ENCOUNTER — TELEPHONE (OUTPATIENT)
Dept: GASTROENTEROLOGY | Facility: CLINIC | Age: 28
End: 2023-12-20
Payer: COMMERCIAL

## 2023-12-20 NOTE — TELEPHONE ENCOUNTER
----- Message from Kika Ngo RN sent at 12/19/2023 10:03 AM CST -----  Contact: [phone]    ----- Message -----  From: Markell Tuttle  Sent: 12/19/2023   9:51 AM CST  To: Justine DAMON Staff    Kt Quispe calling regarding Appointment Access  (message)Pt asking for a call back to help get reschedule for an appt that he missed

## 2024-04-24 ENCOUNTER — OFFICE VISIT (OUTPATIENT)
Dept: GASTROENTEROLOGY | Facility: CLINIC | Age: 29
End: 2024-04-24
Payer: COMMERCIAL

## 2024-04-24 ENCOUNTER — LAB VISIT (OUTPATIENT)
Dept: LAB | Facility: HOSPITAL | Age: 29
End: 2024-04-24
Attending: INTERNAL MEDICINE
Payer: COMMERCIAL

## 2024-04-24 VITALS
DIASTOLIC BLOOD PRESSURE: 72 MMHG | SYSTOLIC BLOOD PRESSURE: 118 MMHG | WEIGHT: 190.69 LBS | HEIGHT: 69 IN | OXYGEN SATURATION: 98 % | TEMPERATURE: 98 F | HEART RATE: 78 BPM | BODY MASS INDEX: 28.24 KG/M2

## 2024-04-24 DIAGNOSIS — K50.813 CROHN'S DISEASE OF BOTH SMALL AND LARGE INTESTINE WITH FISTULA: Primary | ICD-10-CM

## 2024-04-24 DIAGNOSIS — K50.813 CROHN'S DISEASE OF BOTH SMALL AND LARGE INTESTINE WITH FISTULA: ICD-10-CM

## 2024-04-24 LAB
25(OH)D3+25(OH)D2 SERPL-MCNC: 21 NG/ML (ref 30–96)
ALBUMIN SERPL BCP-MCNC: 4.1 G/DL (ref 3.5–5.2)
ALP SERPL-CCNC: 99 U/L (ref 55–135)
ALT SERPL W/O P-5'-P-CCNC: 15 U/L (ref 10–44)
ANION GAP SERPL CALC-SCNC: 8 MMOL/L (ref 8–16)
AST SERPL-CCNC: 18 U/L (ref 10–40)
BASOPHILS # BLD AUTO: 0.05 K/UL (ref 0–0.2)
BASOPHILS NFR BLD: 0.5 % (ref 0–1.9)
BILIRUB SERPL-MCNC: 0.5 MG/DL (ref 0.1–1)
BUN SERPL-MCNC: 7 MG/DL (ref 6–20)
CALCIUM SERPL-MCNC: 9.5 MG/DL (ref 8.7–10.5)
CHLORIDE SERPL-SCNC: 101 MMOL/L (ref 95–110)
CO2 SERPL-SCNC: 30 MMOL/L (ref 23–29)
CREAT SERPL-MCNC: 1 MG/DL (ref 0.5–1.4)
CRP SERPL-MCNC: 15.5 MG/L (ref 0–8.2)
DIFFERENTIAL METHOD BLD: ABNORMAL
EOSINOPHIL # BLD AUTO: 0.1 K/UL (ref 0–0.5)
EOSINOPHIL NFR BLD: 1.1 % (ref 0–8)
ERYTHROCYTE [DISTWIDTH] IN BLOOD BY AUTOMATED COUNT: 13.5 % (ref 11.5–14.5)
EST. GFR  (NO RACE VARIABLE): >60 ML/MIN/1.73 M^2
GLUCOSE SERPL-MCNC: 88 MG/DL (ref 70–110)
HAV IGG SER QL IA: REACTIVE
HBV CORE AB SERPL QL IA: NORMAL
HBV SURFACE AG SERPL QL IA: NORMAL
HCT VFR BLD AUTO: 44 % (ref 40–54)
HCV AB SERPL QL IA: NORMAL
HGB BLD-MCNC: 14.6 G/DL (ref 14–18)
IMM GRANULOCYTES # BLD AUTO: 0.04 K/UL (ref 0–0.04)
IMM GRANULOCYTES NFR BLD AUTO: 0.4 % (ref 0–0.5)
LYMPHOCYTES # BLD AUTO: 1.4 K/UL (ref 1–4.8)
LYMPHOCYTES NFR BLD: 14.2 % (ref 18–48)
MCH RBC QN AUTO: 28.5 PG (ref 27–31)
MCHC RBC AUTO-ENTMCNC: 33.2 G/DL (ref 32–36)
MCV RBC AUTO: 86 FL (ref 82–98)
MONOCYTES # BLD AUTO: 0.7 K/UL (ref 0.3–1)
MONOCYTES NFR BLD: 7.1 % (ref 4–15)
NEUTROPHILS # BLD AUTO: 7.8 K/UL (ref 1.8–7.7)
NEUTROPHILS NFR BLD: 76.7 % (ref 38–73)
NRBC BLD-RTO: 0 /100 WBC
PLATELET # BLD AUTO: 304 K/UL (ref 150–450)
PMV BLD AUTO: 11 FL (ref 9.2–12.9)
POTASSIUM SERPL-SCNC: 4 MMOL/L (ref 3.5–5.1)
PROT SERPL-MCNC: 8.2 G/DL (ref 6–8.4)
RBC # BLD AUTO: 5.12 M/UL (ref 4.6–6.2)
SODIUM SERPL-SCNC: 139 MMOL/L (ref 136–145)
VIT B12 SERPL-MCNC: 325 PG/ML (ref 210–950)
WBC # BLD AUTO: 10.16 K/UL (ref 3.9–12.7)

## 2024-04-24 PROCEDURE — 3008F BODY MASS INDEX DOCD: CPT | Mod: CPTII,S$GLB,, | Performed by: INTERNAL MEDICINE

## 2024-04-24 PROCEDURE — 1159F MED LIST DOCD IN RCRD: CPT | Mod: CPTII,S$GLB,, | Performed by: INTERNAL MEDICINE

## 2024-04-24 PROCEDURE — 3074F SYST BP LT 130 MM HG: CPT | Mod: CPTII,S$GLB,, | Performed by: INTERNAL MEDICINE

## 2024-04-24 PROCEDURE — 82607 VITAMIN B-12: CPT | Performed by: INTERNAL MEDICINE

## 2024-04-24 PROCEDURE — 86140 C-REACTIVE PROTEIN: CPT | Performed by: INTERNAL MEDICINE

## 2024-04-24 PROCEDURE — 86480 TB TEST CELL IMMUN MEASURE: CPT | Performed by: INTERNAL MEDICINE

## 2024-04-24 PROCEDURE — 86706 HEP B SURFACE ANTIBODY: CPT | Performed by: INTERNAL MEDICINE

## 2024-04-24 PROCEDURE — 86735 MUMPS ANTIBODY: CPT | Performed by: INTERNAL MEDICINE

## 2024-04-24 PROCEDURE — 87340 HEPATITIS B SURFACE AG IA: CPT | Performed by: INTERNAL MEDICINE

## 2024-04-24 PROCEDURE — 3078F DIAST BP <80 MM HG: CPT | Mod: CPTII,S$GLB,, | Performed by: INTERNAL MEDICINE

## 2024-04-24 PROCEDURE — 86765 RUBEOLA ANTIBODY: CPT | Performed by: INTERNAL MEDICINE

## 2024-04-24 PROCEDURE — 99215 OFFICE O/P EST HI 40 MIN: CPT | Mod: S$GLB,,, | Performed by: INTERNAL MEDICINE

## 2024-04-24 PROCEDURE — 85025 COMPLETE CBC W/AUTO DIFF WBC: CPT | Performed by: INTERNAL MEDICINE

## 2024-04-24 PROCEDURE — 84433 ASY THIOPURIN S-MTHYLTRNSFRS: CPT | Performed by: INTERNAL MEDICINE

## 2024-04-24 PROCEDURE — G2211 COMPLEX E/M VISIT ADD ON: HCPCS | Mod: S$GLB,,, | Performed by: INTERNAL MEDICINE

## 2024-04-24 PROCEDURE — 80053 COMPREHEN METABOLIC PANEL: CPT | Performed by: INTERNAL MEDICINE

## 2024-04-24 PROCEDURE — 86762 RUBELLA ANTIBODY: CPT | Performed by: INTERNAL MEDICINE

## 2024-04-24 PROCEDURE — 86790 VIRUS ANTIBODY NOS: CPT | Performed by: INTERNAL MEDICINE

## 2024-04-24 PROCEDURE — 1160F RVW MEDS BY RX/DR IN RCRD: CPT | Mod: CPTII,S$GLB,, | Performed by: INTERNAL MEDICINE

## 2024-04-24 PROCEDURE — 86803 HEPATITIS C AB TEST: CPT | Performed by: INTERNAL MEDICINE

## 2024-04-24 PROCEDURE — 86787 VARICELLA-ZOSTER ANTIBODY: CPT | Performed by: INTERNAL MEDICINE

## 2024-04-24 PROCEDURE — 82306 VITAMIN D 25 HYDROXY: CPT | Performed by: INTERNAL MEDICINE

## 2024-04-24 PROCEDURE — 86704 HEP B CORE ANTIBODY TOTAL: CPT | Performed by: INTERNAL MEDICINE

## 2024-04-24 RX ORDER — SIMVASTATIN 40 MG/1
40 TABLET, FILM COATED ORAL NIGHTLY
COMMUNITY
Start: 2023-12-30

## 2024-04-24 RX ORDER — BUPROPION HYDROCHLORIDE 150 MG/1
150 TABLET ORAL EVERY MORNING
COMMUNITY
Start: 2024-02-03 | End: 2024-04-24

## 2024-04-24 RX ORDER — PANTOPRAZOLE SODIUM 40 MG/1
40 TABLET, DELAYED RELEASE ORAL
COMMUNITY
Start: 2024-04-19

## 2024-04-24 RX ORDER — SULFAMETHOXAZOLE AND TRIMETHOPRIM 800; 160 MG/1; MG/1
1 TABLET ORAL 2 TIMES DAILY
COMMUNITY
Start: 2024-03-22 | End: 2024-04-24

## 2024-04-24 NOTE — PROGRESS NOTES
[facility]          Inflammatory Bowel Disease New Patient Consultation Note         TODAY'S VISIT DATE:  4/24/2024    Reason for Consult:    Chief Complaint   Patient presents with    Inflammatory Bowel Disease       PCP: Klaudia, Primary Doctor      Referring MD:   Dr. Ulysses Holcomb    History of Present Illness:  Kt Quispe who is a 28 y.o. male is being seen today at the [facility] on 04/24/2024 for inflammatory bowel disease- Crohn's disease.  This my 1st time seeing him in the office.  His history is noted below.  He reports that over the last year he has had intermittent episodes of loose bowel movements.  He typically only has 1-2 bowel movements a day.  They will sometimes be formed but he will go through periods of time where they are more loose and occasionally have some blood in it.  He has not had any issues with perianal abscesses but he does report having some discomfort in the perianal area currently.  He denies any abdominal pain, nausea, vomiting, fevers, chills.  He reports that his brother has a history of Crohn's disease.  He does not use tobacco.    IBD History:  In 2023 he underwent a colonoscopy for evaluation of bloody diarrhea.  He was found to have active inflammation throughout the entire colon and in the terminal ileum.  Biopsies were consistent with Crohn's disease.  He also had a perianal abscess in 2022 that was drained.  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  His baseline stool calprotectin was elevated at over 300.  He also had an elevated CRP.      IBD Details:  Dx Date:  2023-symptoms ongoing for several years prior to diagnosis  Disease type/distribution:  Crohn's disease/ileum and colon involvement with perianal fistula/abscess in the past  Current Treatment:  None Start Date:  Response:  "  Optimized:   Adverse reactions:   Prior surgeries:  None  CRP Elevation: Y  calprotectin:  Elevated with active disease  Disease Complications:  Perianal fistula/abscess  Extraintestinal manifestations:  None  Prior treatments:   Steroids:  None  5ASA:  None  IMM: None  TNF Inh: None   Anti-Integrin: None   IL 12/23: None  JULIO CESAR Inh: None    Previous Clinical Trials: None    Last Colonoscopy:  March 2023-active inflammation from the rectum to the cecum, active inflammation in the ileum-biopsies with chronic active inflammation throughout the colon and ileum    Other Endoscopies:  March 2023-EGD-normal EGD.  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    Imaging:   MRE:  None   CT:  Pelvic CT in 2022 with an abscess in the perianal area   Other:  No other pertinent imaging    Pertinent Labs:  Lab Results   Component Value Date    CRP 23.7 (H) 03/09/2023     No results found for: "TTGIGA", "IGA"  No results found for: "TSH", "FREET4"  No results found for: "YQXKZTLT95GF", "WATJWSCK72"  Lab Results   Component Value Date    HEPCAB Negative 07/01/2022     Lab Results   Component Value Date    LGB81SFFX Negative 07/01/2022     No results found for: "NIL", "TBAG", "TBAGNIL", "MITOGENNIL", "TBGOLD", "TSPOTSCREN"  No results found for: "TPTMINTERP", "TPMTRESULT"  Lab Results   Component Value Date    CDIFFICILEAN Negative 03/09/2023    CDIFFTOX Negative 03/09/2023     Lab Results   Component Value Date    CALPROTECTIN 355.3 (H) 03/09/2023       Therapeutic Drug Monitoring Labs:  No results found for: "PROMETH"  No results found for: "ANSADAINIT", "INFLIXIMAB", "INFLIXINTERP"    Vaccinations:  No results found for: "HEPBSAB"  No results found for: "HEPAIGG"  No results found for: "VARICELLAZOS", "VARICELLAINT"  Immunization History   Administered Date(s) Administered    COVID-19, MRNA, LN-S, PF (Pfizer) (Purple Cap) 01/06/2021, 02/04/2021, 12/11/2021         Review of Systems  Review of Systems "   Constitutional:  Negative for chills, fever and weight loss.   HENT:  Negative for sore throat.    Eyes:  Negative for pain, discharge and redness.   Respiratory:  Negative for cough, shortness of breath and wheezing.    Cardiovascular:  Negative for chest pain and leg swelling.   Gastrointestinal:  Negative for abdominal pain, blood in stool, constipation, diarrhea, heartburn, melena, nausea and vomiting.   Genitourinary:  Negative for dysuria and frequency.   Musculoskeletal:  Negative for back pain, joint pain and myalgias.   Skin:  Positive for rash.   Neurological:  Negative for focal weakness and seizures.   Endo/Heme/Allergies:  Does not bruise/bleed easily.   Psychiatric/Behavioral:  Negative for depression. The patient is not nervous/anxious.        Medical History:   Past Medical History:   Diagnosis Date    Chronic diarrhea     Food intolerance     Inflammatory bowel disease        Surgical History:  Past Surgical History:   Procedure Laterality Date    COLONOSCOPY N/A 3/21/2023    Procedure: COLONOSCOPY;  Surgeon: Ulysses Holcomb MD;  Location: [facility] ENDOSCOPY;  Service: Endoscopy;  Laterality: N/A;    ESOPHAGOGASTRODUODENOSCOPY N/A 3/21/2023    Procedure: EGD (ESOPHAGOGASTRODUODENOSCOPY);  Surgeon: Ulysses Holcomb MD;  Location: [facility] ENDOSCOPY;  Service: Endoscopy;  Laterality: N/A;  instr via email  sched last case due to pending c.diff labs; if negative pt can be moved to earlier time; A  3/20 pre call complete, pt stated he would have a ride -CE       Family History:   Family History   Problem Relation Name Age of Onset    Crohn's disease Brother         Social History:   Social History     Tobacco Use    Smoking status: Never    Smokeless tobacco: Never       Allergies: Reviewed    Home Medications:   Medication List with Changes/Refills   Current Medications    CLINDAMYCIN (CLEOCIN T) 1 % EXTERNAL SOLUTION    1 application 2 (two) times daily. Apply to affected area    FLUCONAZOLE (DIFLUCAN)  "150 MG TAB    Take 150 mg by mouth Every 3 (three) days.    GAVILYTE-C 240-22.72-6.72 -5.84 GRAM SOLR    Take 4,000 mLs by mouth once.    HYDROCORTISONE (ANUSOL-HC) 25 MG SUPPOSITORY    INSERT 1 SUPPOSITORY RECTALLY TWICE A DAY    PANTOPRAZOLE (PROTONIX) 40 MG TABLET    Take 40 mg by mouth.    SIMVASTATIN (ZOCOR) 40 MG TABLET    Take 40 mg by mouth every evening.   Discontinued Medications    BUPROPION (WELLBUTRIN XL) 150 MG TB24 TABLET    Take 150 mg by mouth every morning.    CLOBETASOL (TEMOVATE) 0.05 % CREAM    1 application 2 (two) times daily. Apply to affected area    SULFAMETHOXAZOLE-TRIMETHOPRIM 800-160MG (BACTRIM DS) 800-160 MG TAB    Take 1 tablet by mouth 2 (two) times daily.    TACROLIMUS (PROTOPIC) 0.1 % OINTMENT    Apply topically 2 (two) times daily.       Physical Exam:  Vital Signs:  /72 (BP Location: Right arm, Patient Position: Sitting, BP Method: Medium (Automatic))   Pulse 78   Temp 97.7 °F (36.5 °C) (Temporal)   Ht 5' 9" (1.753 m)   Wt 86.5 kg (190 lb 11.2 oz)   SpO2 98%   BMI 28.16 kg/m²   Body mass index is 28.16 kg/m².    Physical Exam  Vitals and nursing note reviewed.   Constitutional:       General: He is not in acute distress.     Appearance: Normal appearance. He is well-developed. He is not ill-appearing or toxic-appearing.   HENT:      Head: Normocephalic and atraumatic.   Eyes:      General: No scleral icterus.     Pupils: Pupils are equal, round, and reactive to light.   Neck:      Thyroid: No thyromegaly.   Cardiovascular:      Rate and Rhythm: Normal rate and regular rhythm.      Heart sounds: No murmur heard.     No friction rub.   Pulmonary:      Effort: Pulmonary effort is normal.      Breath sounds: Normal breath sounds. No wheezing or rales.   Abdominal:      General: Bowel sounds are normal. There is no distension.      Palpations: Abdomen is soft. There is no mass.      Tenderness: There is no abdominal tenderness. There is no guarding or rebound. "   Genitourinary:     Comments: Perianal exam with 2 small scars from likely prior fistula activity but no active fistula noted and no perianal abscess appreciated  Musculoskeletal:      Right lower leg: No edema.      Left lower leg: No edema.   Lymphadenopathy:      Cervical: No cervical adenopathy.   Skin:     Findings: No rash.   Neurological:      General: No focal deficit present.      Mental Status: He is alert and oriented to person, place, and time.   Psychiatric:         Mood and Affect: Mood normal.         Behavior: Behavior normal.         Thought Content: Thought content normal.         Judgment: Judgment normal.         Labs: reviewed and pertinent noted above    Assessment/Plan:  Kt Quispe is a 28 y.o. male with Crohn's disease of the small bowel and colon complicated by perianal abscess/fistula in the past. The following issues were addresssed:    1. Crohn's disease of both small and large intestine with fistula      1. Crohn's disease:  He clearly has ileocolonic Crohn's disease with a previous perianal abscess.  Currently he does not have any active perianal disease.  Today we discussed the fact that he does have multiple risk factors for more progressive and aggressive disease in the future that might require surgical intervention.  He also continues to have intermittent symptoms of diarrhea with blood in the stools.  I think it would be in his best interest to proceed with starting medical therapy.  We discussed different treatment options today including combination therapy with a TNF inhibitor and an immunomodulator, use of StelaraFede Entyvio.  Given his current work situation and when considering risks and benefits of the different medications he would prefer to start Stelara.  This will require 1 IV dose followed by injections at home every 8 weeks.  We discussed increased risk of infection with Stelara.  We also discussed the need to monitor labs every 6 months.  Once he starts on  Stelara we will plan to check a follow-up calprotectin level in 12 weeks and if that has shown improvement we will arrange for colonoscopy in around 6 months.       # IBD specific health maintenance:  Colon cancer surveillance:  Up-to-date    Annual:  - Eye exam:  Not applicable  - Skin exam (if on IMM/TNF):  Up-to-date-recommend annual skin exam  - reminded pt to use sunblock/hats/sunprotective clothing  - PAP (if immunosuppressed):  Not applicable    DEXA:  Not applicable    Vitamin D:  Check today    Vaccines:    Influenza:  Recommend annual vaccination   Pneumovax:  Review in the future   HAV:  Check serology   HBV:  Check serology   Tdap:  Discuss in the future   MMR:  Check serologies   VZV:  Check serology   HZV:  Discuss in the future   HPV:  Discuss in the future   Meningococcus:  Review in the future   COVID: Vaccinated    Follow up: Follow up in about 4 months (around 8/24/2024).    Visit today is associated with current or anticipated ongoing medical care related to this patient's single serious condition/complex condition (Crohn's disease).      Thank you again for sending Kt Quispe to see Dr. Cristian Fletcher today at the [facility]. Please don't hesitate to contact Dr. Fletcher if there are any questions regarding this evaluation, or if you have any other patients with inflammatory bowel disease for whom you would like a consultation. You can reach Dr. Fletcher at [phone] or by email at [email or web address]    Madhav Flethcer MD  Department of Gastroenterology  Inflammatory Bowel Disease

## 2024-04-24 NOTE — PROGRESS NOTES
IBD PATIENT INTAKE:    Confirm current PCP: No, Primary Doctor  If no PCP-  number given to establish [phone]: Yes    IBD THERAPY (name, dose/frequency):  None    Antibiotics (past 30 Days):  No  If yes   Indication:  Name of antibiotic:  Completion date:       Answers submitted by the patient for this visit:  New Patient Questionnaire  (Submitted on 4/24/2024)  rash: Yes

## 2024-04-24 NOTE — PATIENT INSTRUCTIONS
Get labs today  We will start working on Stelara approval  Submit stool test 12 weeks after starting Stelara  Follow up in 4 months

## 2024-04-25 LAB
GAMMA INTERFERON BACKGROUND BLD IA-ACNC: 0.05 IU/ML
M TB IFN-G CD4+ BCKGRND COR BLD-ACNC: 0.01 IU/ML
M TB IFN-G CD4+ BCKGRND COR BLD-ACNC: 0.01 IU/ML
MITOGEN IGNF BCKGRD COR BLD-ACNC: 9.95 IU/ML
MUMPS IGG INTERPRETATION: POSITIVE
MUMPS IGG SCREEN: >300 AU/ML
RUBEOLA IGG ANTIBODY: 88.7 AU/ML
RUBEOLA INTERPRETATION: POSITIVE
RUBV IGG SER-ACNC: 84.7 IU/ML
RUBV IGG SER-IMP: REACTIVE
TB GOLD PLUS: NEGATIVE
VARICELLA INTERPRETATION: POSITIVE
VARICELLA ZOSTER IGG: 189.4

## 2024-04-27 LAB
HBV SURFACE AB SER QL IA: POSITIVE
HBV SURFACE AB SERPL IA-ACNC: NORMAL MIU/ML

## 2024-04-30 LAB
6-METHYLMERCAPTOPURINE RIBOSIDE: 6.4 NMOL/ML/H (ref 5.04–9.57)
6-METHYLMERCAPTOPURINE: 3.98 NMOL/ML/H (ref 3–6.66)
6-METHYLTHIOGUANINE RIBOSIDE: 3.1 NMOL/ML/H (ref 2.7–5.84)
TPMT INTERPRETATION: NORMAL
TPMT REVIEWED BY: NORMAL

## 2024-05-08 ENCOUNTER — PATIENT MESSAGE (OUTPATIENT)
Dept: GASTROENTEROLOGY | Facility: CLINIC | Age: 29
End: 2024-05-08
Payer: COMMERCIAL

## 2024-05-09 ENCOUNTER — TELEPHONE (OUTPATIENT)
Dept: GASTROENTEROLOGY | Facility: CLINIC | Age: 29
End: 2024-05-09
Payer: COMMERCIAL

## 2024-05-09 DIAGNOSIS — K50.813 CROHN'S DISEASE OF BOTH SMALL AND LARGE INTESTINE WITH FISTULA: Primary | ICD-10-CM

## 2024-05-09 RX ORDER — HEPARIN 100 UNIT/ML
500 SYRINGE INTRAVENOUS
OUTPATIENT
Start: 2024-05-09

## 2024-05-09 RX ORDER — IPRATROPIUM BROMIDE AND ALBUTEROL SULFATE 2.5; .5 MG/3ML; MG/3ML
3 SOLUTION RESPIRATORY (INHALATION)
OUTPATIENT
Start: 2024-05-09

## 2024-05-09 RX ORDER — ACETAMINOPHEN 325 MG/1
650 TABLET ORAL
OUTPATIENT
Start: 2024-05-09

## 2024-05-09 RX ORDER — SODIUM CHLORIDE 0.9 % (FLUSH) 0.9 %
10 SYRINGE (ML) INJECTION
Status: CANCELLED | OUTPATIENT
Start: 2024-05-09

## 2024-05-09 RX ORDER — METHYLPREDNISOLONE SOD SUCC 125 MG
40 VIAL (EA) INJECTION
OUTPATIENT
Start: 2024-05-09

## 2024-05-09 RX ORDER — EPINEPHRINE 1 MG/ML
0.3 INJECTION, SOLUTION, CONCENTRATE INTRAVENOUS
OUTPATIENT
Start: 2024-05-09

## 2024-05-09 RX ORDER — USTEKINUMAB 90 MG/ML
90 INJECTION, SOLUTION SUBCUTANEOUS
Qty: 1 ML | Refills: 1 | Status: ACTIVE | OUTPATIENT
Start: 2024-05-09

## 2024-05-09 RX ORDER — DIPHENHYDRAMINE HYDROCHLORIDE 50 MG/ML
25 INJECTION INTRAMUSCULAR; INTRAVENOUS
OUTPATIENT
Start: 2024-05-09

## 2024-05-09 NOTE — TELEPHONE ENCOUNTER
- Patient with Crohn's disease/ileum and colon involvement with perianal fistula/abscess in the past   - Colonoscopy March 2023 -active inflammation from the rectum to the cecum, active inflammation in the ileum-biopsies with chronic active inflammation throughout the colon and ileum   - 3/9/23 calprotectin elevated 355.3  - 4/24/24 CRP elevated 15.5  - Plan to start Stelara   - CBC CMP 4/2024, repeat 10/2024  - TB and HepB negative 4/2024, repeat 4/2025    Therapy plan for IV Stelara (520 mg IV x 1 dose) sent to [facility]  SC Stelara 90 mg q8 week Rx sent to [facility]  Case discussed with Dr Fletcher

## 2024-08-20 ENCOUNTER — OFFICE VISIT (OUTPATIENT)
Dept: GASTROENTEROLOGY | Facility: CLINIC | Age: 29
End: 2024-08-20
Payer: COMMERCIAL

## 2024-08-20 VITALS
BODY MASS INDEX: 26.7 KG/M2 | SYSTOLIC BLOOD PRESSURE: 108 MMHG | WEIGHT: 186.5 LBS | HEART RATE: 77 BPM | TEMPERATURE: 98 F | HEIGHT: 70 IN | OXYGEN SATURATION: 98 % | DIASTOLIC BLOOD PRESSURE: 66 MMHG

## 2024-08-20 DIAGNOSIS — D84.821 IMMUNOSUPPRESSION DUE TO DRUG THERAPY: ICD-10-CM

## 2024-08-20 DIAGNOSIS — Z79.899 IMMUNOSUPPRESSION DUE TO DRUG THERAPY: ICD-10-CM

## 2024-08-20 DIAGNOSIS — K50.813 CROHN'S DISEASE OF BOTH SMALL AND LARGE INTESTINE WITH FISTULA: Primary | ICD-10-CM

## 2024-08-20 PROCEDURE — 3008F BODY MASS INDEX DOCD: CPT | Mod: CPTII,S$GLB,, | Performed by: INTERNAL MEDICINE

## 2024-08-20 PROCEDURE — 99214 OFFICE O/P EST MOD 30 MIN: CPT | Mod: S$GLB,,, | Performed by: INTERNAL MEDICINE

## 2024-08-20 PROCEDURE — 3074F SYST BP LT 130 MM HG: CPT | Mod: CPTII,S$GLB,, | Performed by: INTERNAL MEDICINE

## 2024-08-20 PROCEDURE — 3078F DIAST BP <80 MM HG: CPT | Mod: CPTII,S$GLB,, | Performed by: INTERNAL MEDICINE

## 2024-08-20 PROCEDURE — 1159F MED LIST DOCD IN RCRD: CPT | Mod: CPTII,S$GLB,, | Performed by: INTERNAL MEDICINE

## 2024-08-20 PROCEDURE — G2211 COMPLEX E/M VISIT ADD ON: HCPCS | Mod: S$GLB,,, | Performed by: INTERNAL MEDICINE

## 2024-08-20 NOTE — PROGRESS NOTES
[facility]          Inflammatory Bowel Disease Follow Up Note         TODAY'S VISIT DATE:  8/20/2024    Reason for Consult:    Chief Complaint   Patient presents with    Crohn's Disease       PCP: Klaudia, Primary Doctor      Referring MD:   No ref. provider found    History of Present Illness:  Kt Quispe who is a 29 y.o. male is being seen today at the [facility] on 08/20/2024 for inflammatory bowel disease- Crohn's disease.  He is here today for a follow-up appointment.  After our last visit he started Stelara on Antoinette 3, 2024.  He was supposed to do his 1st injection on July 29 but he actually took this on August 4.  He reports that he has noticed significant improvement since starting the Stelara.  His abdominal pain has resolved.  He was having regular diarrhea previously and this has essentially resolved.  He would occasionally still has a soft bowel movement but for the most part his stools are more formed.  He has not seen any blood in his stools since starting the Stelara.  He does not feel 100% back to normal he feels the best that he has in a long time.  He has not had any side effects from the Stelara.  Overall he is doing quite well.  He denies any new complaints.    IBD History:  In 2023 he underwent a colonoscopy for evaluation of bloody diarrhea.  He was found to have active inflammation throughout the entire colon and in the terminal ileum.  Biopsies were consistent with Crohn's disease.  He also had a perianal abscess in 2022 that was drained.  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  His baseline stool calprotectin was elevated at over 300.  He also had an elevated CRP.  He started Stelara on Antoinette 3, 2024.    IBD Details:  Dx Date:  2023-symptoms ongoing for several years prior to diagnosis  Disease  "type/distribution:  Crohn's disease/ileum and colon involvement with perianal fistula/abscess in the past  Current Treatment:  Stelara 90 mg every 8 weeks Start Date:  Antoinette 3, 2024 Response:  Symptom improvement   Optimized:  No  Adverse reactions:  None  Prior surgeries:  None  CRP Elevation: Y  calprotectin:  Elevated with active disease  Disease Complications:  Perianal fistula/abscess  Extraintestinal manifestations:  None  Prior treatments:   Steroids:  None  5ASA:  None  IMM: None  TNF Inh: None   Anti-Integrin: None   IL 12/23:  Currently on Stelara  JULIO CESAR Inh: None    Previous Clinical Trials: None    Last Colonoscopy:  March 2023-active inflammation from the rectum to the cecum, active inflammation in the ileum-biopsies with chronic active inflammation throughout the colon and ileum    Other Endoscopies:  March 2023-EGD-normal EGD.  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    Imaging:   MRE:  None   CT:  Pelvic CT in 2022 with an abscess in the perianal area   Other:  No other pertinent imaging    Pertinent Labs:  Lab Results   Component Value Date    CRP 15.5 (H) 04/24/2024     No results found for: "TTGIGA", "IGA"  No results found for: "TSH", "FREET4"  Lab Results   Component Value Date    HJSIYNYX40SE 21 (L) 04/24/2024    DXNTMNCS60 325 04/24/2024     Lab Results   Component Value Date    HEPBSAG Non-reactive 04/24/2024    HEPBCAB Non-reactive 04/24/2024    HEPCAB Non-reactive 04/24/2024     Lab Results   Component Value Date    JIH67BITF Negative 07/01/2022     Lab Results   Component Value Date    NIL 0.24321 04/24/2024    MITOGENNIL 9.953 04/24/2024     Lab Results   Component Value Date    TPTMINTERP SEE BELOW 04/24/2024     Lab Results   Component Value Date    CDIFFICILEAN Negative 03/09/2023    CDIFFTOX Negative 03/09/2023     Lab Results   Component Value Date    CALPROTECTIN 355.3 (H) 03/09/2023       Therapeutic Drug Monitoring Labs:  No results found for: "PROMETH"  No " "results found for: "ANSADAINIT", "INFLIXIMAB", "INFLIXINTERP"    Vaccinations:  No results found for: "HEPBSAB"  Lab Results   Component Value Date    HEPAIGG Reactive 04/24/2024     Lab Results   Component Value Date    VARICELLAZOS 189.40 04/24/2024    VARICELLAINT Positive 04/24/2024     Immunization History   Administered Date(s) Administered    COVID-19, MRNA, LN-S, PF (Pfizer) (Purple Cap) 01/06/2021, 02/04/2021, 12/11/2021         Review of Systems  Review of Systems   Constitutional:  Negative for chills, fever and weight loss.   HENT:  Negative for sore throat.    Eyes:  Negative for pain, discharge and redness.   Respiratory:  Negative for cough, shortness of breath and wheezing.    Cardiovascular:  Negative for chest pain and leg swelling.   Gastrointestinal:  Negative for abdominal pain, blood in stool, constipation, diarrhea, heartburn, melena, nausea and vomiting.   Genitourinary:  Negative for dysuria and frequency.   Musculoskeletal:  Negative for back pain, joint pain and myalgias.   Skin:  Negative for rash.   Neurological:  Negative for focal weakness and seizures.   Endo/Heme/Allergies:  Does not bruise/bleed easily.   Psychiatric/Behavioral:  Negative for depression. The patient is not nervous/anxious.        Medical History:   Past Medical History:   Diagnosis Date    Chronic diarrhea     Food intolerance     Inflammatory bowel disease        Surgical History:  Past Surgical History:   Procedure Laterality Date    COLONOSCOPY N/A 3/21/2023    Procedure: COLONOSCOPY;  Surgeon: Ulysses Holcomb MD;  Location: [facility] ENDOSCOPY;  Service: Endoscopy;  Laterality: N/A;    ESOPHAGOGASTRODUODENOSCOPY N/A 3/21/2023    Procedure: EGD (ESOPHAGOGASTRODUODENOSCOPY);  Surgeon: Ulysses Holcomb MD;  Location: [facility] ENDOSCOPY;  Service: Endoscopy;  Laterality: N/A;  instr via email  sched last case due to pending c.diff labs; if negative pt can be moved to earlier time; A  3/20 pre call complete, pt stated he " "would have a ride -CE       Family History:   Family History   Problem Relation Name Age of Onset    Crohn's disease Brother         Social History:   Social History     Tobacco Use    Smoking status: Never    Smokeless tobacco: Never       Allergies: Reviewed    Home Medications:   Medication List with Changes/Refills   Current Medications    CLINDAMYCIN (CLEOCIN T) 1 % EXTERNAL SOLUTION    1 application 2 (two) times daily. Apply to affected area    FLUCONAZOLE (DIFLUCAN) 150 MG TAB    Take 150 mg by mouth Every 3 (three) days.    GAVILYTE-C 240-22.72-6.72 -5.84 GRAM SOLR    Take 4,000 mLs by mouth once.    HYDROCORTISONE (ANUSOL-HC) 25 MG SUPPOSITORY    INSERT 1 SUPPOSITORY RECTALLY TWICE A DAY    PANTOPRAZOLE (PROTONIX) 40 MG TABLET    Take 40 mg by mouth.    SIMVASTATIN (ZOCOR) 40 MG TABLET    Take 40 mg by mouth every evening.    USTEKINUMAB (STELARA) 90 MG/ML SYRG SYRINGE    Inject 1 mL (90 mg total) into the skin every 8 weeks.       Physical Exam:  Vital Signs:  /66 (BP Location: Right arm, Patient Position: Sitting, BP Method: Small (Automatic))   Pulse 77   Temp 98.1 °F (36.7 °C) (Temporal)   Ht 5' 10" (1.778 m)   Wt 84.6 kg (186 lb 8.2 oz)   SpO2 98%   BMI 26.76 kg/m²   Body mass index is 26.76 kg/m².    Physical Exam  Vitals and nursing note reviewed.   Constitutional:       General: He is not in acute distress.     Appearance: Normal appearance. He is well-developed. He is not ill-appearing or toxic-appearing.   HENT:      Head: Normocephalic and atraumatic.   Eyes:      General: No scleral icterus.     Pupils: Pupils are equal, round, and reactive to light.   Neck:      Thyroid: No thyromegaly.   Cardiovascular:      Rate and Rhythm: Normal rate and regular rhythm.      Heart sounds: No murmur heard.     No friction rub.   Pulmonary:      Effort: Pulmonary effort is normal.      Breath sounds: Normal breath sounds. No wheezing or rales.   Abdominal:      General: Bowel sounds are normal. " There is no distension.      Palpations: Abdomen is soft. There is no mass.      Tenderness: There is no abdominal tenderness. There is no guarding or rebound.   Musculoskeletal:      Right lower leg: No edema.      Left lower leg: No edema.   Lymphadenopathy:      Cervical: No cervical adenopathy.   Skin:     Findings: No rash.   Neurological:      General: No focal deficit present.      Mental Status: He is alert and oriented to person, place, and time.   Psychiatric:         Mood and Affect: Mood normal.         Behavior: Behavior normal.         Thought Content: Thought content normal.         Judgment: Judgment normal.         Labs: reviewed and pertinent noted above    Assessment/Plan:  Kt Quispe is a 29 y.o. male with Crohn's disease of the small bowel and colon complicated by perianal abscess/fistula in the past. The following issues were addresssed:    1. Crohn's disease of both small and large intestine with fistula    2. Immunosuppression due to drug therapy      1. Crohn's disease:  Overall he seems to be doing significantly better.  He submitted his stool calprotectin level today.  Once we have these results back we can proceed with further management decisions.  If his calprotectin level is improving significantly then we will arrange for colonoscopy sometime around December.  If the calprotectin level has not improved then we will plan to optimize his Stelara dosing before considering a follow-up colonoscopy.  He will be due for labs in October.    2. Immunosuppression:  Recommend annual flu shot.  We will discuss other vaccinations in the future.       # IBD specific health maintenance:  Colon cancer surveillance:  Up-to-date    Annual:  - Eye exam:  Not applicable  - Skin exam (if on IMM/TNF):  Up-to-date-recommend annual skin exam  - reminded pt to use sunblock/hats/sunprotective clothing  - PAP (if immunosuppressed):  Not applicable    DEXA:  Not applicable    Vitamin D:  Previously  low-discuss supplementation in the future.    Vaccines:    Influenza:  Recommend annual vaccination   Pneumovax:  Review in the future   HAV:  Immune   HBV:  Immune   Tdap:  Discuss in the future   MMR:  Immune   VZV:  Immune   HZV:  Discuss in the future   HPV:  Discuss in the future   Meningococcus:  Review in the future   COVID: Vaccinated    Follow up: Follow up in about 6 months (around 2/20/2025).    Visit today is associated with current or anticipated ongoing medical care related to this patient's single serious condition/complex condition (Crohn's disease).      Thank you again for sending Kt Quispe to see Dr. Cristian lFetcher today at the [facility]. Please don't hesitate to contact Dr. Fletcher if there are any questions regarding this evaluation, or if you have any other patients with inflammatory bowel disease for whom you would like a consultation. You can reach Dr. Fletcher at [phone] or by email at [email or web address]    Madhav Fletcher MD  Department of Gastroenterology  Inflammatory Bowel Disease

## 2024-08-26 ENCOUNTER — PATIENT MESSAGE (OUTPATIENT)
Dept: GASTROENTEROLOGY | Facility: HOSPITAL | Age: 29
End: 2024-08-26
Payer: COMMERCIAL

## 2024-08-26 ENCOUNTER — TELEPHONE (OUTPATIENT)
Dept: GASTROENTEROLOGY | Facility: CLINIC | Age: 29
End: 2024-08-26
Payer: COMMERCIAL

## 2024-08-26 DIAGNOSIS — K50.813 CROHN'S DISEASE OF BOTH SMALL AND LARGE INTESTINE WITH FISTULA: Primary | ICD-10-CM

## 2024-08-27 ENCOUNTER — TELEPHONE (OUTPATIENT)
Dept: GASTROENTEROLOGY | Facility: CLINIC | Age: 29
End: 2024-08-27
Payer: COMMERCIAL

## 2024-09-06 ENCOUNTER — LAB VISIT (OUTPATIENT)
Dept: LAB | Facility: HOSPITAL | Age: 29
End: 2024-09-06
Attending: STUDENT IN AN ORGANIZED HEALTH CARE EDUCATION/TRAINING PROGRAM
Payer: COMMERCIAL

## 2024-09-06 ENCOUNTER — OFFICE VISIT (OUTPATIENT)
Dept: ALLERGY | Facility: CLINIC | Age: 29
End: 2024-09-06
Payer: COMMERCIAL

## 2024-09-06 VITALS — BODY MASS INDEX: 27.15 KG/M2 | HEIGHT: 70 IN | WEIGHT: 189.63 LBS

## 2024-09-06 DIAGNOSIS — J30.9 ALLERGIC RHINITIS, UNSPECIFIED SEASONALITY, UNSPECIFIED TRIGGER: ICD-10-CM

## 2024-09-06 DIAGNOSIS — H10.13 ALLERGIC CONJUNCTIVITIS OF BOTH EYES: ICD-10-CM

## 2024-09-06 DIAGNOSIS — J30.9 ALLERGIC RHINITIS, UNSPECIFIED SEASONALITY, UNSPECIFIED TRIGGER: Primary | ICD-10-CM

## 2024-09-06 PROCEDURE — 86003 ALLG SPEC IGE CRUDE XTRC EA: CPT | Mod: 59 | Performed by: STUDENT IN AN ORGANIZED HEALTH CARE EDUCATION/TRAINING PROGRAM

## 2024-09-06 PROCEDURE — 99999 PR PBB SHADOW E&M-EST. PATIENT-LVL III: CPT | Mod: PBBFAC,,, | Performed by: STUDENT IN AN ORGANIZED HEALTH CARE EDUCATION/TRAINING PROGRAM

## 2024-09-06 PROCEDURE — 86003 ALLG SPEC IGE CRUDE XTRC EA: CPT | Performed by: STUDENT IN AN ORGANIZED HEALTH CARE EDUCATION/TRAINING PROGRAM

## 2024-09-06 PROCEDURE — 36415 COLL VENOUS BLD VENIPUNCTURE: CPT | Performed by: STUDENT IN AN ORGANIZED HEALTH CARE EDUCATION/TRAINING PROGRAM

## 2024-09-06 RX ORDER — FLUTICASONE PROPIONATE 50 MCG
SPRAY, SUSPENSION (ML) NASAL
Qty: 16 G | Refills: 11 | Status: SHIPPED | OUTPATIENT
Start: 2024-09-06

## 2024-09-06 RX ORDER — AZELASTINE HCL 205.5 UG/1
SPRAY NASAL
Qty: 30 ML | Refills: 11 | Status: SHIPPED | OUTPATIENT
Start: 2024-09-06

## 2024-09-06 RX ORDER — OLOPATADINE HYDROCHLORIDE 2 MG/ML
1 SOLUTION/ DROPS OPHTHALMIC DAILY PRN
Qty: 2.5 ML | Refills: 5 | Status: SHIPPED | OUTPATIENT
Start: 2024-09-06 | End: 2025-09-06

## 2024-09-06 NOTE — PROGRESS NOTES
ALLERGY & IMMUNOLOGY CLINIC - INITIAL CONSULTATION      HISTORY OF PRESENT ILLNESS     Patient ID: Kt Quispe is a 29 y.o. male    CC: allergic rhinoconjunctivitis triggered by his cat    HPI: Kt Quispe is a 29 y.o. male presenting for allergic rhinoconjunctivitis triggered by his cat.    He and wife adopted cat about 3-4 weeks ago. Since then, he has been sneezing more, runny nose. Eyes get itchy and red when he has contact with the cat. When they were picking out the cat, some of the cats seemed to cause a rash as well. Symptoms are worse at home.     Patient endorses congestion, sneezing, rhinorrhea, post-nasal drip, ocular pruritus.  Patient denies cough, nasal pruritus.  Symptoms occur every day.    He was taking allegra, now the zyrtec every day. He says it helps, but doesn't always provide adequate relief.  He has tried flonase for the past week, which helps a little. Using 1 SEN daily.   Hasn't tried eye drops.    No shortness of breath or wheezing.      MEDICAL HISTORY     Vaccines:    Immunization History   Administered Date(s) Administered    COVID-19, MRNA, LN-S, PF (MODERNA FULL 0.5 ML DOSE) 01/06/2021, 02/04/2021, 12/11/2021    COVID-19, MRNA, LN-S, PF (Pfizer) (Purple Cap) 01/06/2021, 02/04/2021, 12/11/2021    COVID-19, mRNA, LNP-S, bivalent booster, PF (Moderna Omicron)12 + YEARS 09/17/2022    Hepatitis A, Pediatric/Adolescent, 2 Dose 11/09/2012, 11/12/2013    Hepatitis B 1996, 1996, 03/29/1997, 04/16/2004    IPV 09/20/2001    Influenza 09/20/2022    Influenza - Quadrivalent - PF *Preferred* (6 months and older) 09/16/2022, 09/04/2023    MMR 09/20/2001, 08/17/2002    Meningococcal Conjugate (MCV4P) 10/13/2008, 11/12/2013    OPV 1996, 1996, 1996, 06/28/1998    Tdap 10/13/2008    Varicella 04/05/2001, 08/31/2010     Medical Hx:   Patient Active Problem List   Diagnosis    Crohn's disease of both small and large intestine with fistula     Surgical Hx:   Past Surgical  "History:   Procedure Laterality Date    COLONOSCOPY N/A 3/21/2023    Procedure: COLONOSCOPY;  Surgeon: Ulysses Holcomb MD;  Location: [facility] ENDOSCOPY;  Service: Endoscopy;  Laterality: N/A;    ESOPHAGOGASTRODUODENOSCOPY N/A 3/21/2023    Procedure: EGD (ESOPHAGOGASTRODUODENOSCOPY);  Surgeon: Ulysses Holcomb MD;  Location: [facility] ENDOSCOPY;  Service: Endoscopy;  Laterality: N/A;  instr via email  sched last case due to pending c.diff labs; if negative pt can be moved to earlier time; A  3/20 pre call complete, pt stated he would have a ride -CE     Medications:   Current Outpatient Medications on File Prior to Visit   Medication Sig Dispense Refill    ustekinumab (STELARA) 90 mg/mL Syrg syringe Inject 1 mL (90 mg total) into the skin every 8 weeks. 1 mL 1     No current facility-administered medications on file prior to visit.     H/o Asthma: denies  H/o Rhinitis: denies    Drug Allergies: Review of patient's allergies indicates:  No Known Allergies    Env/Occ:   Pets: cat, does trigger symptoms.   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.     Social Hx:   Social History     Tobacco Use    Smoking status: Never    Smokeless tobacco: Never   Substance Use Topics    Alcohol use: Not Currently     Family Hx:   Asthma: no  Allergic rhinitis: no  Family History   Problem Relation Name Age of Onset    Crohn's disease Brother        PHYSICAL EXAM     VS: Ht 5' 10" (1.778 m)   Wt 86 kg (189 lb 9.5 oz)   BMI 27.20 kg/m²   GENERAL: Alert, NAD, well-appearing  EYES: EOMI, no conjunctival injection, no discharge, no infraorbital shiners  NOSE: NT 2-3+ B/L, no stringing mucus, no polyps visualized  ORAL: MMM, no ulcers, no thrush  LUNGS: CTAB, no w/r/c, no increased WOB  HEART: RRR, normal S1/S2, no m/g/r  DERM: no rashes  NEURO: normal speech, normal gait, no facial asymmetry     LABORATORY STUDIES     Component      Latest Ref Rng 4/24/2024   WBC      3.90 - 12.70 K/uL 10.16    RBC      4.60 - 6.20 M/uL 5.12    Hemoglobin      14.0 " - 18.0 g/dL 14.6    Hematocrit      40.0 - 54.0 % 44.0    MCV      82 - 98 fL 86    MCH      27.0 - 31.0 pg 28.5    MCHC      32.0 - 36.0 g/dL 33.2    RDW      11.5 - 14.5 % 13.5    Platelet Count      150 - 450 K/uL 304    MPV      9.2 - 12.9 fL 11.0    Immature Granulocytes      0.0 - 0.5 % 0.4    Gran # (ANC)      1.8 - 7.7 K/uL 7.8 (H)    Immature Grans (Abs)      0.00 - 0.04 K/uL 0.04    Lymph #      1.0 - 4.8 K/uL 1.4    Mono #      0.3 - 1.0 K/uL 0.7    Eos #      0.0 - 0.5 K/uL 0.1    Baso #      0.00 - 0.20 K/uL 0.05    Differential Method Automated    Sodium      136 - 145 mmol/L 139    Potassium      3.5 - 5.1 mmol/L 4.0    Chloride      95 - 110 mmol/L 101    CO2      23 - 29 mmol/L 30 (H)    Glucose      70 - 110 mg/dL 88    BUN      6 - 20 mg/dL 7    Creatinine      0.5 - 1.4 mg/dL 1.0    Calcium      8.7 - 10.5 mg/dL 9.5    PROTEIN TOTAL      6.0 - 8.4 g/dL 8.2    Albumin      3.5 - 5.2 g/dL 4.1    BILIRUBIN TOTAL      0.1 - 1.0 mg/dL 0.5    ALP      55 - 135 U/L 99    AST      10 - 40 U/L 18    ALT      10 - 44 U/L 15    eGFR      >60 mL/min/1.73 m^2 >60.0    Anion Gap      8 - 16 mmol/L 8    CRP      0.0 - 8.2 mg/L 15.5 (H)       ALLERGEN TESTING     Immunocaps: ordered     CHART REVIEW     Reviewed labs.     ASSESSMENT & PLAN     Kt Quispe is a 29 y.o. male with     # Allergic rhinoconjunctivitis triggered by his cat: Got cat 3-4 weeks ago, and has been bothered by symptoms since.   -immunocaps for aeroallergens ordered including cat.  -continue cetrizine 10 mg daily. Okay to take BID if needed.  -increase flonase to 1-2 SEN BID.  -start azelastine nasal spray 1-2 SEN BID.  -counseled on proper technique for use of nasal sprays.   -start pataday eye drops daily prn.  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       Follow up: 2 months     Betty Sanchez,  MD  Allergy/Immunology

## 2024-09-09 LAB
A ALTERNATA IGE QN: <0.1 KU/L
A FUMIGATUS IGE QN: <0.1 KU/L
ALLERGEN BOXELDER MAPLE TREE IGE: 0.11 KU/L
ALLERGEN WHITE ASH TREE IGE: <0.1 KU/L
BAHIA GRASS IGE QN: <0.1 KU/L
BERMUDA GRASS IGE QN: <0.1 KU/L
CAT DANDER IGE QN: 73 KU/L
CEDAR IGE QN: <0.1 KU/L
COTTONWOOD IGE QN: <0.1 KU/L
D FARINAE IGE QN: 6.21 KU/L
D PTERONYSS IGE QN: 6.69 KU/L
DEPRECATED CEDAR IGE RAST QL: NORMAL
DEPRECATED TIMOTHY IGE RAST QL: NORMAL
DOG DANDER IGE QN: 3.35 KU/L
ELDER IGE QN: <0.1 KU/L
ENGL PLANTAIN IGE QN: <0.1 KU/L
JOHNSON GRASS IGE QN: <0.1 KU/L
PECAN/HICK TREE IGE QN: <0.1 KU/L
RAST CLASS: ABNORMAL
RAST CLASS: NORMAL
SALTWORT IGE QN: <0.1 KU/L
SHEEP SORREL IGE QN: <0.1 KU/L
TIMOTHY IGE QN: <0.1 KU/L
WEST RAGWEED IGE QN: <0.1 KU/L
WHITE OAK IGE QN: <0.1 KU/L

## 2024-09-12 ENCOUNTER — PATIENT MESSAGE (OUTPATIENT)
Dept: ALLERGY | Facility: CLINIC | Age: 29
End: 2024-09-12
Payer: COMMERCIAL

## 2024-09-13 DIAGNOSIS — K50.813 CROHN'S DISEASE OF BOTH SMALL AND LARGE INTESTINE WITH FISTULA: ICD-10-CM

## 2024-09-13 RX ORDER — USTEKINUMAB 90 MG/ML
INJECTION, SOLUTION SUBCUTANEOUS
Qty: 1 ML | Refills: 0 | Status: SHIPPED | OUTPATIENT
Start: 2024-09-13

## 2024-09-13 NOTE — TELEPHONE ENCOUNTER
Primary provider: ANGELITA    IBD medications  Stelara 90 mg every 8 weeks     Refill request for  Stelara 90 mg every 8 weeks     Allergies reviewed Yes    Drug Monitoring labs/frequency for all IBD meds:  CBC CMP q6 months, TB and HepB yearly    Lab Results   Component Value Date    HEPBSAG Non-reactive 04/24/2024    HEPBCAB Non-reactive 04/24/2024     Lab Results   Component Value Date    TBGOLDPLUS Negative 04/24/2024     Lab Results   Component Value Date    WBC 10.16 04/24/2024    HGB 14.6 04/24/2024    HCT 44.0 04/24/2024    MCV 86 04/24/2024     04/24/2024     Lab Results   Component Value Date    CREATININE 1.0 04/24/2024    ALBUMIN 4.1 04/24/2024    BILITOT 0.5 04/24/2024    ALKPHOS 99 04/24/2024    AST 18 04/24/2024    ALT 15 04/24/2024       Lab due date (mo/yr):  10/2024    Labs scheduled: yes- 10/17/24    Next appt: 2/24/25    RX refill sent to provider for amount until next labs: Yes

## 2024-09-19 ENCOUNTER — PATIENT MESSAGE (OUTPATIENT)
Dept: GASTROENTEROLOGY | Facility: CLINIC | Age: 29
End: 2024-09-19
Payer: COMMERCIAL

## 2024-09-20 ENCOUNTER — LAB VISIT (OUTPATIENT)
Dept: LAB | Facility: HOSPITAL | Age: 29
End: 2024-09-20
Attending: INTERNAL MEDICINE
Payer: COMMERCIAL

## 2024-09-20 DIAGNOSIS — K50.813 CROHN'S DISEASE OF BOTH SMALL AND LARGE INTESTINE WITH FISTULA: ICD-10-CM

## 2024-09-20 PROCEDURE — 80299 QUANTITATIVE ASSAY DRUG: CPT | Performed by: INTERNAL MEDICINE

## 2024-09-20 PROCEDURE — 83520 IMMUNOASSAY QUANT NOS NONAB: CPT | Performed by: INTERNAL MEDICINE

## 2024-09-20 PROCEDURE — 36415 COLL VENOUS BLD VENIPUNCTURE: CPT | Performed by: INTERNAL MEDICINE

## 2024-09-23 LAB
FUAUA DOSE (IN MG): 90
FUAUA INTERVAL (IN WEEKS): 8
USTEKINUMAB AB SERPL IA-ACNC: <10 AU/ML
USTEKINUMAB SERPL IA-MCNC: 4.3 MCG/ML

## 2024-09-24 ENCOUNTER — TELEPHONE (OUTPATIENT)
Dept: GASTROENTEROLOGY | Facility: CLINIC | Age: 29
End: 2024-09-24
Payer: COMMERCIAL

## 2024-09-24 DIAGNOSIS — K50.813 CROHN'S DISEASE OF BOTH SMALL AND LARGE INTESTINE WITH FISTULA: Primary | ICD-10-CM

## 2024-09-24 RX ORDER — USTEKINUMAB 90 MG/ML
90 INJECTION, SOLUTION SUBCUTANEOUS
Qty: 1 ML | Refills: 1 | Status: SHIPPED | OUTPATIENT
Start: 2024-09-24

## 2024-09-24 NOTE — TELEPHONE ENCOUNTER
Spoke with pt  - reviewed plan of care for Stelara dose optimization to 90 mg SC every 6 weeks  - pt agreed with the plan and had no questions

## 2024-09-24 NOTE — TELEPHONE ENCOUNTER
- Patient with Crohn's disease/ileum and colon involvement with perianal fistula/abscess in the past currently treated with Stelara 90 mg SC every 8 weeks (start date Antoinette 3, 2024, LD 9/23 ND pending optimization, aiming for 11/4 pending insurance)  - 9/20/24 UST level resulted 4.3 (true trough would be slightly lower given level was drawn a few days early)  - Repeat calprotectin after starting Stelara did not decline significantly (3/9/23 calprotectin 355.3 vs  8/20/24 calprotectin 304)  - Plan for Stelara dose optimization to 90 mg SC every 6 weeks.  Rx sent to [facility] for auth.  - CBC CMP due 10/2024- lab appt already scheduled.  Repeat TB and HepB due 4/2025. Next Dr Fletcher OV 2/24/25.    - Called pt to review plan- no answer, unable to LVM. MyChart sent.  - Case discussed with Dr Fletcher

## 2024-09-24 NOTE — TELEPHONE ENCOUNTER
----- Message from Madhav Fletcher MD sent at 9/24/2024  7:12 AM CDT -----  His calprotectin level did not relate declined very much after starting Stelara and his level is slightly low but this is a few days prior to when the true trough level would be.  I would like to see if we can get approval for every 6 week injections for him.  Thank you.

## 2024-10-10 ENCOUNTER — PATIENT MESSAGE (OUTPATIENT)
Dept: GASTROENTEROLOGY | Facility: CLINIC | Age: 29
End: 2024-10-10
Payer: COMMERCIAL

## 2024-10-28 ENCOUNTER — LAB VISIT (OUTPATIENT)
Dept: LAB | Facility: HOSPITAL | Age: 29
End: 2024-10-28
Attending: INTERNAL MEDICINE
Payer: COMMERCIAL

## 2024-10-28 DIAGNOSIS — K50.813 CROHN'S DISEASE OF BOTH SMALL AND LARGE INTESTINE WITH FISTULA: ICD-10-CM

## 2024-10-28 LAB
ALBUMIN SERPL BCP-MCNC: 3.7 G/DL (ref 3.5–5.2)
ALP SERPL-CCNC: 87 U/L (ref 40–150)
ALT SERPL W/O P-5'-P-CCNC: 10 U/L (ref 10–44)
ANION GAP SERPL CALC-SCNC: 10 MMOL/L (ref 8–16)
AST SERPL-CCNC: 14 U/L (ref 10–40)
BASOPHILS # BLD AUTO: 0.06 K/UL (ref 0–0.2)
BASOPHILS NFR BLD: 0.6 % (ref 0–1.9)
BILIRUB SERPL-MCNC: 0.5 MG/DL (ref 0.1–1)
BUN SERPL-MCNC: 7 MG/DL (ref 6–20)
CALCIUM SERPL-MCNC: 8.8 MG/DL (ref 8.7–10.5)
CHLORIDE SERPL-SCNC: 102 MMOL/L (ref 95–110)
CO2 SERPL-SCNC: 26 MMOL/L (ref 23–29)
CREAT SERPL-MCNC: 1 MG/DL (ref 0.5–1.4)
CRP SERPL-MCNC: 27.8 MG/L (ref 0–8.2)
DIFFERENTIAL METHOD BLD: ABNORMAL
EOSINOPHIL # BLD AUTO: 0.2 K/UL (ref 0–0.5)
EOSINOPHIL NFR BLD: 1.8 % (ref 0–8)
ERYTHROCYTE [DISTWIDTH] IN BLOOD BY AUTOMATED COUNT: 13.8 % (ref 11.5–14.5)
EST. GFR  (NO RACE VARIABLE): >60 ML/MIN/1.73 M^2
GLUCOSE SERPL-MCNC: 98 MG/DL (ref 70–110)
HCT VFR BLD AUTO: 41.4 % (ref 40–54)
HGB BLD-MCNC: 13.2 G/DL (ref 14–18)
IMM GRANULOCYTES # BLD AUTO: 0.05 K/UL (ref 0–0.04)
IMM GRANULOCYTES NFR BLD AUTO: 0.5 % (ref 0–0.5)
LYMPHOCYTES # BLD AUTO: 1.8 K/UL (ref 1–4.8)
LYMPHOCYTES NFR BLD: 19 % (ref 18–48)
MCH RBC QN AUTO: 26.7 PG (ref 27–31)
MCHC RBC AUTO-ENTMCNC: 31.9 G/DL (ref 32–36)
MCV RBC AUTO: 84 FL (ref 82–98)
MONOCYTES # BLD AUTO: 0.8 K/UL (ref 0.3–1)
MONOCYTES NFR BLD: 8.8 % (ref 4–15)
NEUTROPHILS # BLD AUTO: 6.5 K/UL (ref 1.8–7.7)
NEUTROPHILS NFR BLD: 69.3 % (ref 38–73)
NRBC BLD-RTO: 0 /100 WBC
PLATELET # BLD AUTO: 301 K/UL (ref 150–450)
PMV BLD AUTO: 10.8 FL (ref 9.2–12.9)
POTASSIUM SERPL-SCNC: 3.7 MMOL/L (ref 3.5–5.1)
PROT SERPL-MCNC: 7.6 G/DL (ref 6–8.4)
RBC # BLD AUTO: 4.94 M/UL (ref 4.6–6.2)
SODIUM SERPL-SCNC: 138 MMOL/L (ref 136–145)
WBC # BLD AUTO: 9.42 K/UL (ref 3.9–12.7)

## 2024-10-28 PROCEDURE — 80053 COMPREHEN METABOLIC PANEL: CPT | Performed by: INTERNAL MEDICINE

## 2024-10-28 PROCEDURE — 86140 C-REACTIVE PROTEIN: CPT | Performed by: INTERNAL MEDICINE

## 2024-10-28 PROCEDURE — 36415 COLL VENOUS BLD VENIPUNCTURE: CPT | Performed by: INTERNAL MEDICINE

## 2024-10-28 PROCEDURE — 85025 COMPLETE CBC W/AUTO DIFF WBC: CPT | Performed by: INTERNAL MEDICINE

## 2024-12-12 ENCOUNTER — PATIENT MESSAGE (OUTPATIENT)
Dept: GASTROENTEROLOGY | Facility: CLINIC | Age: 29
End: 2024-12-12
Payer: COMMERCIAL

## 2025-01-06 ENCOUNTER — PATIENT MESSAGE (OUTPATIENT)
Dept: GASTROENTEROLOGY | Facility: CLINIC | Age: 30
End: 2025-01-06
Payer: COMMERCIAL

## 2025-01-06 DIAGNOSIS — K50.813 CROHN'S DISEASE OF BOTH SMALL AND LARGE INTESTINE WITH FISTULA: Primary | ICD-10-CM

## 2025-01-06 NOTE — TELEPHONE ENCOUNTER
Spoke with Kt:  -CD, ileum and colon involvement with perianal fistula/abscess    IBD meds:  - Stelara q6w, LD: 12/16, ND: 1/27    Unresolved rectal bleeding and mucous   - concerned this continues despite being on Stelara since 6/3/24   - optimized to q6w 09/2024    - BM/d: 1-2, soft to soft formed  - +blood on TP only, several times/wk  - +mucous  - further assessment questions are negative  - OV: 3/13/25    Dr. Fletcher will be updated.

## 2025-01-10 ENCOUNTER — LAB VISIT (OUTPATIENT)
Dept: LAB | Facility: HOSPITAL | Age: 30
End: 2025-01-10
Attending: EMERGENCY MEDICINE
Payer: COMMERCIAL

## 2025-01-10 DIAGNOSIS — K50.813 CROHN'S DISEASE OF BOTH SMALL AND LARGE INTESTINE WITH FISTULA: ICD-10-CM

## 2025-01-10 PROCEDURE — 83993 ASSAY FOR CALPROTECTIN FECAL: CPT | Performed by: INTERNAL MEDICINE

## 2025-01-13 DIAGNOSIS — K50.813 CROHN'S DISEASE OF BOTH SMALL AND LARGE INTESTINE WITH FISTULA: ICD-10-CM

## 2025-01-13 LAB — CALPROTECTIN STL-MCNT: 945 MCG/G

## 2025-01-13 RX ORDER — USTEKINUMAB 90 MG/ML
INJECTION, SOLUTION SUBCUTANEOUS
Qty: 1 ML | Refills: 1 | Status: SHIPPED | OUTPATIENT
Start: 2025-01-13

## 2025-01-13 NOTE — TELEPHONE ENCOUNTER
"Primary provider: Dr. Madhav Fletcher    IBD medications: Stelara 90 mg SC Q 6 weeks    Refill request for:      Pended Medication(s)   Requested Prescriptions     Pending Prescriptions Disp Refills    STELARA 90 mg/mL Syrg syringe [Pharmacy Med Name: STELARA  90MG PREFILLED SYRINGE  ML PFS] 1 mL 1     Sig: INJECT 1 SYRINGE SUBCUTANEOUSLY  EVERY 6 WEEKS           Allergies reviewed: Yes    Drug Monitoring labs/frequency for all IBD meds:    CBC and CMP every 6 months  TB and Hep B every 12 months    Lab Results   Component Value Date    HEPBSAG Non-reactive 04/24/2024    HEPBCAB Non-reactive 04/24/2024     Lab Results   Component Value Date    TBGOLDPLUS Negative 04/24/2024     No results found for: "QUANTNILVALU", "QUANTIFERON", "QUANTTBGDPL"   No results found for: "TSPOTSCREN"  Lab Results   Component Value Date    WBYTTAHF63TC 21 (L) 04/24/2024    QPOWHMVT56 325 04/24/2024     Lab Results   Component Value Date    WBC 9.42 10/28/2024    HGB 13.2 (L) 10/28/2024    HCT 41.4 10/28/2024    MCV 84 10/28/2024     10/28/2024     Lab Results   Component Value Date    CREATININE 1.0 10/28/2024    ALBUMIN 3.7 10/28/2024    BILITOT 0.5 10/28/2024    ALKPHOS 87 10/28/2024    AST 14 10/28/2024    ALT 10 10/28/2024       Lab due date (mo/yr):  4/25    Labs scheduled: No - but patient has an OV before or when labs are due     Next appt: 3/13/25    RX refill sent to provider for amount until next labs: Yes       "

## 2025-01-14 ENCOUNTER — PATIENT MESSAGE (OUTPATIENT)
Dept: GASTROENTEROLOGY | Facility: CLINIC | Age: 30
End: 2025-01-14
Payer: COMMERCIAL

## 2025-01-15 ENCOUNTER — TELEPHONE (OUTPATIENT)
Dept: GASTROENTEROLOGY | Facility: CLINIC | Age: 30
End: 2025-01-15
Payer: COMMERCIAL

## 2025-01-15 NOTE — TELEPHONE ENCOUNTER
Spoke with the patient about his recent calprotectin level which was actually higher than his pretreatment level.  He reports that after the 1st Stelara infusion he felt really good but since switching to the injectable version he is having more symptoms again including mucus and blood in his stools and abdominal cramping.  Overall he feels like he has not had any significant improvement over his symptoms prior to starting therapy.  His doses already been optimized every 6 weeks.  Today I recommend that we set up a colonoscopy the 1st week of March.  If there is active disease isolated to the rectum we can potentially add a suppository but if there is more proximal disease activity then switching to Humira would be a good option for him.  He is on board with this plan.  We also have an appointment set up in early March as well.

## 2025-01-28 ENCOUNTER — TELEPHONE (OUTPATIENT)
Dept: ENDOSCOPY | Facility: HOSPITAL | Age: 30
End: 2025-01-28
Payer: COMMERCIAL

## 2025-01-28 VITALS — HEIGHT: 70 IN | WEIGHT: 185 LBS | BODY MASS INDEX: 26.48 KG/M2

## 2025-01-28 DIAGNOSIS — Z12.11 COLON CANCER SCREENING: Primary | ICD-10-CM

## 2025-01-28 DIAGNOSIS — K50.90 CROHN'S DISEASE WITHOUT COMPLICATION, UNSPECIFIED GASTROINTESTINAL TRACT LOCATION: ICD-10-CM

## 2025-01-28 NOTE — TELEPHONE ENCOUNTER
"Referral for procedure from [facility]      Spoke to pt to schedule procedure(s) Colonoscopy       Physician to perform procedure(s) Dr. ADILENE Fletcher  Date of Procedure (s) 3/7/25  Arrival Time 7:45 AM  Time of Procedure(s) 8:45 AM   Location of Procedure(s) [facility] [address] [facility]  Type of Rx Prep sent to patient: PEG  Instructions provided to patient via [facility]    Patient was informed on the following information and verbalized understanding. Screening questionnaire reviewed with patient and complete. If procedure requires anesthesia, a responsible adult needs to be present to accompany the patient home, patient cannot drive after receiving anesthesia. Appointment details are tentative, especially check-in time. Patient will receive a prep-op call 7 days prior to confirm check-in time for procedure. If applicable the patient should contact their pharmacy to verify Rx for procedure prep is ready for pick-up. Patient was advised to call the scheduling department at [phone] if pharmacy states no Rx is available. Patient was advised to call the endoscopy scheduling department if any questions or concerns arise.       Endoscopy Scheduling Department        From: Jocelyn Rdz RN   Sent: 1/7/2025  12:23 PM CST   To: [facility] Patients; *     Procedure: Colonoscopy     Diagnosis: Crohn's disease     Procedure Timing: March 2025   **Pt has OV 3/13/25, please schedule before this visit if possible**     *If within 4 weeks selected, please zheng as high priority*     *If greater than 12 weeks, please select "5-12 weeks" and delay sending until 3 months prior to requested date*     Location: Any Site     Additional Scheduling Information: No scheduling concerns     Prep Specifications:Maria Teresa     Is the patient taking a GLP-1 Agonist:no     Have you attached a patient to this message: yes   "

## 2025-02-19 ENCOUNTER — PATIENT MESSAGE (OUTPATIENT)
Dept: GASTROENTEROLOGY | Facility: CLINIC | Age: 30
End: 2025-02-19
Payer: COMMERCIAL

## 2025-02-20 ENCOUNTER — PATIENT MESSAGE (OUTPATIENT)
Dept: GASTROENTEROLOGY | Facility: CLINIC | Age: 30
End: 2025-02-20
Payer: COMMERCIAL

## 2025-03-07 ENCOUNTER — HOSPITAL ENCOUNTER (OUTPATIENT)
Facility: HOSPITAL | Age: 30
Discharge: HOME | End: 2025-03-07
Attending: INTERNAL MEDICINE | Admitting: INTERNAL MEDICINE
Payer: COMMERCIAL

## 2025-03-07 ENCOUNTER — ANESTHESIA EVENT (OUTPATIENT)
Dept: ENDOSCOPY | Facility: HOSPITAL | Age: 30
End: 2025-03-07
Payer: COMMERCIAL

## 2025-03-07 ENCOUNTER — ANESTHESIA (OUTPATIENT)
Dept: ENDOSCOPY | Facility: HOSPITAL | Age: 30
End: 2025-03-07
Payer: COMMERCIAL

## 2025-03-07 VITALS
SYSTOLIC BLOOD PRESSURE: 113 MMHG | HEART RATE: 70 BPM | DIASTOLIC BLOOD PRESSURE: 55 MMHG | RESPIRATION RATE: 18 BRPM | OXYGEN SATURATION: 98 %

## 2025-03-07 DIAGNOSIS — K50.90 CROHN'S DISEASE: ICD-10-CM

## 2025-03-07 DIAGNOSIS — K50.813 CROHN'S DISEASE OF BOTH SMALL AND LARGE INTESTINE WITH FISTULA: Primary | ICD-10-CM

## 2025-03-07 PROCEDURE — 88342 IMHCHEM/IMCYTCHM 1ST ANTB: CPT | Performed by: PATHOLOGY

## 2025-03-07 PROCEDURE — 37000009 HC ANESTHESIA EA ADD 15 MINS: Performed by: INTERNAL MEDICINE

## 2025-03-07 PROCEDURE — 63600175 PHARM REV CODE 636 W HCPCS: Performed by: NURSE ANESTHETIST, CERTIFIED REGISTERED

## 2025-03-07 PROCEDURE — 37000008 HC ANESTHESIA 1ST 15 MINUTES: Performed by: INTERNAL MEDICINE

## 2025-03-07 PROCEDURE — 27201012 HC FORCEPS, HOT/COLD, DISP: Performed by: INTERNAL MEDICINE

## 2025-03-07 PROCEDURE — 45380 COLONOSCOPY AND BIOPSY: CPT | Mod: ,,, | Performed by: INTERNAL MEDICINE

## 2025-03-07 PROCEDURE — 88305 TISSUE EXAM BY PATHOLOGIST: CPT | Performed by: PATHOLOGY

## 2025-03-07 PROCEDURE — 25000003 PHARM REV CODE 250: Performed by: NURSE ANESTHETIST, CERTIFIED REGISTERED

## 2025-03-07 PROCEDURE — 45380 COLONOSCOPY AND BIOPSY: CPT | Performed by: INTERNAL MEDICINE

## 2025-03-07 RX ORDER — LIDOCAINE HYDROCHLORIDE 20 MG/ML
INJECTION INTRAVENOUS
Status: DISCONTINUED | OUTPATIENT
Start: 2025-03-07 | End: 2025-03-07

## 2025-03-07 RX ORDER — SODIUM CHLORIDE 9 MG/ML
INJECTION, SOLUTION INTRAVENOUS CONTINUOUS
Status: DISCONTINUED | OUTPATIENT
Start: 2025-03-07 | End: 2025-03-07 | Stop reason: HOSPADM

## 2025-03-07 RX ORDER — PROPOFOL 10 MG/ML
VIAL (ML) INTRAVENOUS
Status: DISCONTINUED | OUTPATIENT
Start: 2025-03-07 | End: 2025-03-07

## 2025-03-07 RX ADMIN — PROPOFOL 100 MG: 10 INJECTION, EMULSION INTRAVENOUS at 09:03

## 2025-03-07 RX ADMIN — LIDOCAINE HYDROCHLORIDE 100 MG: 20 INJECTION INTRAVENOUS at 09:03

## 2025-03-07 RX ADMIN — SODIUM CHLORIDE: 9 INJECTION, SOLUTION INTRAVENOUS at 09:03

## 2025-03-07 RX ADMIN — PROPOFOL 150 MCG/KG/MIN: 10 INJECTION, EMULSION INTRAVENOUS at 09:03

## 2025-03-07 NOTE — ANESTHESIA POSTPROCEDURE EVALUATION
Anesthesia Post Evaluation    Patient: Kt Quispe    Procedure(s) Performed: Procedure(s) (LRB):  COLONOSCOPY (N/A)    Final Anesthesia Type: general      Patient location during evaluation: PACU  Patient participation: Yes- Able to Participate  Level of consciousness: awake and alert  Post-procedure vital signs: reviewed and stable  Pain management: adequate  Airway patency: patent    PONV status at discharge: No PONV  Anesthetic complications: no      Cardiovascular status: blood pressure returned to baseline  Respiratory status: unassisted  Hydration status: euvolemic  Follow-up not needed.              Vitals Value Taken Time   /55 03/07/25 10:36   Temp 98 03/07/25 11:23   Pulse 70 03/07/25 10:37   Resp 13 03/07/25 10:36   SpO2 98 % 03/07/25 10:36         Event Time   Out of Recovery 10:45:03         Pain/Karolina Score: Karolina Score: 9 (3/7/2025 10:02 AM)

## 2025-03-07 NOTE — ANESTHESIA PREPROCEDURE EVALUATION
03/07/2025  Kt Quispe is a 29 y.o., male.      Pre-op Assessment    I have reviewed the Patient Summary Reports.       I have reviewed the Medications.     Review of Systems      Physical Exam  General: Well nourished    Airway:  Mallampati: II / I  Mouth Opening: Normal  TM Distance: Normal  Tongue: Normal  Neck ROM: Normal ROM    Dental:  Intact    Chest/Lungs:  Clear to auscultation, Normal Respiratory Rate        Anesthesia Plan  Type of Anesthesia, risks & benefits discussed:    Anesthesia Type: Gen Natural Airway  Intra-op Monitoring Plan: Standard ASA Monitors  Induction:  IV  Airway Plan: , Post-Induction  Informed Consent: Informed consent signed with the Patient and all parties understand the risks and agree with anesthesia plan.  All questions answered.   ASA Score: 2  Day of Surgery Review of History & Physical: H&P Update referred to the surgeon/provider.I have interviewed and examined the patient. I have reviewed the patient's H&P dated: There are no significant changes.     Ready For Surgery From Anesthesia Perspective.     .

## 2025-03-07 NOTE — H&P
Short Stay Endoscopy History and Physical    PCP - No, Primary Doctor    Procedure - Colonoscopy  ASA - per anesthesia  Mallampati - per anesthesia  Plan of anesthesia - MAC    HPI:  This is a 29 y.o. male here for evaluation of : Crohn's disease on  Stelara 90 mg SC Q 6 weeks     ROS:  Constitutional: No fevers, chills  CV: No chest pain  Pulm: No cough  Ophtho: No vision changes  GI: see HPI  Derm: No rash    Medical History:  has a past medical history of Chronic diarrhea, Food intolerance, and Inflammatory bowel disease.    Surgical History:  has a past surgical history that includes Esophagogastroduodenoscopy (N/A, 3/21/2023) and Colonoscopy (N/A, 3/21/2023).    Family History: family history includes Crohn's disease in his brother.. Otherwise no colon cancer, inflammatory bowel disease, or GI malignancies.    Social History:  reports that he has never smoked. He has never used smokeless tobacco. He reports that he does not currently use alcohol. He reports that he does not use drugs.    Review of patient's allergies indicates:  No Known Allergies    Medications:   Prescriptions Prior to Admission[1]      Vital Signs: There were no vitals filed for this visit.    General Appearance: Well appearing in no acute distress    Labs:  Lab Results   Component Value Date    WBC 9.42 10/28/2024    HGB 13.2 (L) 10/28/2024    HCT 41.4 10/28/2024     10/28/2024    ALT 10 10/28/2024    AST 14 10/28/2024     10/28/2024    K 3.7 10/28/2024     10/28/2024    CREATININE 1.0 10/28/2024    BUN 7 10/28/2024    CO2 26 10/28/2024       I have explained the risks and benefits of endoscopy procedures to the patient/their POA including but not limited to bleeding, perforation, infection, and death.  The patient/their POA was asked if they understand and allowed to ask any further questions to their satisfaction.    Nakul Wu MD         [1]   Medications Prior to Admission   Medication Sig Dispense  Refill Last Dose/Taking    azelastine 205.5 mcg (0.15 %) Spry Use 1-2 sprays in each nostril twice daily 30 mL 11     fluticasone propionate (FLONASE) 50 mcg/actuation nasal spray Use 1-2 sprays in each nostril twice daily 16 g 11 Unknown    olopatadine (PATADAY ONCE DAILY RELIEF) 0.2 % Drop Place 1 drop into both eyes daily as needed (for itchy or watery eyes). 2.5 mL 5     STELARA 90 mg/mL Syrg syringe INJECT 1 SYRINGE SUBCUTANEOUSLY  EVERY 6 WEEKS 1 mL 1 More than a month

## 2025-03-07 NOTE — TRANSFER OF CARE
Anesthesia Transfer of Care Note    Patient: Kt Quispe    Procedure(s) Performed: Procedure(s) (LRB):  COLONOSCOPY (N/A)    Patient location: GI    Anesthesia Type: general    Transport from OR: Transported from OR on room air with adequate spontaneous ventilation    Post pain: adequate analgesia    Post assessment: no apparent anesthetic complications and tolerated procedure well    Post vital signs: stable    Level of consciousness: awake    Nausea/Vomiting: no nausea/vomiting    Complications: none    Transfer of care protocol was followed      Last vitals: Visit Vitals  BP (!) 90/52 (BP Location: Left arm, Patient Position: Lying)   Pulse 66   Resp 17   SpO2 96%

## 2025-03-07 NOTE — PROVATION PATIENT INSTRUCTIONS
Discharge Summary/Instructions after an Endoscopic Procedure  Patient Name: Kt Quispe  Patient MRN: [id number]  Patient YOB: 1995 Friday, March 7, 2025  Madhav Fletcher MD  Dear patient,  As a result of recent federal legislation (The Federal Cures Act), you may   receive lab or pathology results from your procedure in your [facility]   account before your physician is able to contact you. Your physician or   their representative will relay the results to you with their   recommendations at their soonest availability.  Thank you,  RESTRICTIONS:  During your procedure today, you received medications for sedation.  These   medications may affect your judgment, balance and coordination.  Therefore,   for 24 hours, you have the following restrictions:   - DO NOT drive a car, operate machinery, make legal/financial decisions,   sign important papers or drink alcohol.    ACTIVITY:  Today: no heavy lifting, straining or running due to procedural   sedation/anesthesia.  The following day: return to full activity including work.  DIET:  Eat and drink normally unless instructed otherwise.     TREATMENT FOR COMMON SIDE EFFECTS:  - Mild abdominal pain, nausea, belching, bloating or excessive gas:  rest,   eat lightly and use a heating pad.  - Sore Throat: treat with throat lozenges and/or gargle with warm salt   water.  - Because air was used during the procedure, expelling large amounts of air   from your rectum or belching is normal.  - If a bowel prep was taken, you may not have a bowel movement for 1-3 days.    This is normal.  SYMPTOMS TO WATCH FOR AND REPORT TO YOUR PHYSICIAN:  1. Abdominal pain or bloating, other than gas cramps.  2. Chest pain.  3. Back pain.  4. Signs of infection such as: chills or fever occurring within 24 hours   after the procedure.  5. Rectal bleeding, which would show as bright red, maroon, or black stools.   (A tablespoon of blood from the rectum is not serious, especially  if   hemorrhoids are present.)  6. Vomiting.  7. Weakness or dizziness.  GO DIRECTLY TO THE NEAREST EMERGENCY ROOM IF YOU HAVE ANY OF THE FOLLOWING:      Difficulty breathing              Chills and/or fever over 101 F   Persistent vomiting and/or vomiting blood   Severe abdominal pain   Severe chest pain   Black, tarry stools   Bleeding- more than one tablespoon   Any other symptom or condition that you feel may need urgent attention  Your doctor recommends these additional instructions:  If any biopsies were taken, your doctors clinic will contact you in 1 to 2   weeks with any results.  - Discharge patient to home.   - Resume previous diet today.   - Continue present medications.   - Await pathology results.   - Repeat colonoscopy (date not yet determined) to assess disease activity.   - Return to my office as previously scheduled.   - Will plan to switch therapy given ongoing disease activity in spite of   optimized Stelara dosing. Given his work schedule and inadequate   effectiveness of Stelara I think Humira would be a good next option for   him.  - Patient has a contact number available for emergencies.  The signs and   symptoms of potential delayed complications were discussed with the   patient.  Return to normal activities tomorrow.  Written discharge   instructions were provided to the patient.  For questions, problems or results please call your physician - Madhav Fletcher MD at Work:  [phone].  [facility], EMERGENCY ROOM PHONE NUMBER: [phone]  IF A COMPLICATION OR EMERGENCY SITUATION ARISES AND YOU ARE UNABLE TO REACH   YOUR PHYSICIAN - GO DIRECTLY TO THE EMERGENCY ROOM.  Madhav Fletcher MD  3/7/2025 10:03:09 AM  This report has been verified and signed electronically.  Dear patient,  As a result of recent federal legislation (The Federal Cures Act), you may   receive lab or pathology results from your procedure in your [facility]   account before your physician is able  to contact you. Your physician or   their representative will relay the results to you with their   recommendations at their soonest availability.  Thank you,  PROVATION

## 2025-03-10 ENCOUNTER — RESULTS FOLLOW-UP (OUTPATIENT)
Dept: GASTROENTEROLOGY | Facility: HOSPITAL | Age: 30
End: 2025-03-10

## 2025-03-11 LAB
FINAL PATHOLOGIC DIAGNOSIS: NORMAL
GROSS: NORMAL
Lab: NORMAL
SUPPLEMENTAL DIAGNOSIS: NORMAL

## 2025-03-13 ENCOUNTER — OFFICE VISIT (OUTPATIENT)
Dept: GASTROENTEROLOGY | Facility: CLINIC | Age: 30
End: 2025-03-13
Payer: COMMERCIAL

## 2025-03-13 ENCOUNTER — LAB VISIT (OUTPATIENT)
Dept: LAB | Facility: HOSPITAL | Age: 30
End: 2025-03-13
Attending: INTERNAL MEDICINE
Payer: COMMERCIAL

## 2025-03-13 VITALS
HEART RATE: 72 BPM | OXYGEN SATURATION: 98 % | WEIGHT: 182.31 LBS | TEMPERATURE: 99 F | BODY MASS INDEX: 26.1 KG/M2 | SYSTOLIC BLOOD PRESSURE: 113 MMHG | HEIGHT: 70 IN | DIASTOLIC BLOOD PRESSURE: 76 MMHG

## 2025-03-13 DIAGNOSIS — Z79.899 IMMUNOSUPPRESSION DUE TO DRUG THERAPY: ICD-10-CM

## 2025-03-13 DIAGNOSIS — K50.813 CROHN'S DISEASE OF BOTH SMALL AND LARGE INTESTINE WITH FISTULA: ICD-10-CM

## 2025-03-13 DIAGNOSIS — D84.821 IMMUNOSUPPRESSION DUE TO DRUG THERAPY: ICD-10-CM

## 2025-03-13 DIAGNOSIS — K50.813 CROHN'S DISEASE OF BOTH SMALL AND LARGE INTESTINE WITH FISTULA: Primary | ICD-10-CM

## 2025-03-13 LAB
25(OH)D3+25(OH)D2 SERPL-MCNC: 17 NG/ML (ref 30–96)
ALBUMIN SERPL BCP-MCNC: 3.7 G/DL (ref 3.5–5.2)
ALP SERPL-CCNC: 96 U/L (ref 40–150)
ALT SERPL W/O P-5'-P-CCNC: 14 U/L (ref 10–44)
ANION GAP SERPL CALC-SCNC: 11 MMOL/L (ref 8–16)
AST SERPL-CCNC: 16 U/L (ref 10–40)
BASOPHILS # BLD AUTO: 0.05 K/UL (ref 0–0.2)
BASOPHILS NFR BLD: 0.6 % (ref 0–1.9)
BILIRUB SERPL-MCNC: 0.5 MG/DL (ref 0.1–1)
BUN SERPL-MCNC: 9 MG/DL (ref 6–20)
CALCIUM SERPL-MCNC: 8.9 MG/DL (ref 8.7–10.5)
CHLORIDE SERPL-SCNC: 104 MMOL/L (ref 95–110)
CO2 SERPL-SCNC: 23 MMOL/L (ref 23–29)
CREAT SERPL-MCNC: 0.9 MG/DL (ref 0.5–1.4)
CRP SERPL-MCNC: 23 MG/L (ref 0–8.2)
DIFFERENTIAL METHOD BLD: ABNORMAL
EOSINOPHIL # BLD AUTO: 0.2 K/UL (ref 0–0.5)
EOSINOPHIL NFR BLD: 2.2 % (ref 0–8)
ERYTHROCYTE [DISTWIDTH] IN BLOOD BY AUTOMATED COUNT: 14.6 % (ref 11.5–14.5)
EST. GFR  (NO RACE VARIABLE): >60 ML/MIN/1.73 M^2
GLUCOSE SERPL-MCNC: 89 MG/DL (ref 70–110)
HBV CORE AB SERPL QL IA: NORMAL
HBV SURFACE AG SERPL QL IA: NORMAL
HCT VFR BLD AUTO: 41.6 % (ref 40–54)
HGB BLD-MCNC: 13.5 G/DL (ref 14–18)
IMM GRANULOCYTES # BLD AUTO: 0.03 K/UL (ref 0–0.04)
IMM GRANULOCYTES NFR BLD AUTO: 0.4 % (ref 0–0.5)
LYMPHOCYTES # BLD AUTO: 2.1 K/UL (ref 1–4.8)
LYMPHOCYTES NFR BLD: 26.5 % (ref 18–48)
MCH RBC QN AUTO: 26.6 PG (ref 27–31)
MCHC RBC AUTO-ENTMCNC: 32.5 G/DL (ref 32–36)
MCV RBC AUTO: 82 FL (ref 82–98)
MONOCYTES # BLD AUTO: 0.8 K/UL (ref 0.3–1)
MONOCYTES NFR BLD: 9.8 % (ref 4–15)
NEUTROPHILS # BLD AUTO: 4.7 K/UL (ref 1.8–7.7)
NEUTROPHILS NFR BLD: 60.5 % (ref 38–73)
NRBC BLD-RTO: 0 /100 WBC
PLATELET # BLD AUTO: 319 K/UL (ref 150–450)
PMV BLD AUTO: 10.4 FL (ref 9.2–12.9)
POTASSIUM SERPL-SCNC: 3.8 MMOL/L (ref 3.5–5.1)
PROT SERPL-MCNC: 8 G/DL (ref 6–8.4)
RBC # BLD AUTO: 5.08 M/UL (ref 4.6–6.2)
SODIUM SERPL-SCNC: 138 MMOL/L (ref 136–145)
VIT B12 SERPL-MCNC: 366 PG/ML (ref 210–950)
WBC # BLD AUTO: 7.82 K/UL (ref 3.9–12.7)

## 2025-03-13 PROCEDURE — 86480 TB TEST CELL IMMUN MEASURE: CPT | Performed by: INTERNAL MEDICINE

## 2025-03-13 PROCEDURE — 3074F SYST BP LT 130 MM HG: CPT | Mod: CPTII,S$GLB,, | Performed by: INTERNAL MEDICINE

## 2025-03-13 PROCEDURE — 1159F MED LIST DOCD IN RCRD: CPT | Mod: CPTII,S$GLB,, | Performed by: INTERNAL MEDICINE

## 2025-03-13 PROCEDURE — 85025 COMPLETE CBC W/AUTO DIFF WBC: CPT | Performed by: INTERNAL MEDICINE

## 2025-03-13 PROCEDURE — 1160F RVW MEDS BY RX/DR IN RCRD: CPT | Mod: CPTII,S$GLB,, | Performed by: INTERNAL MEDICINE

## 2025-03-13 PROCEDURE — G2211 COMPLEX E/M VISIT ADD ON: HCPCS | Mod: S$GLB,,, | Performed by: INTERNAL MEDICINE

## 2025-03-13 PROCEDURE — 86706 HEP B SURFACE ANTIBODY: CPT | Performed by: INTERNAL MEDICINE

## 2025-03-13 PROCEDURE — 36415 COLL VENOUS BLD VENIPUNCTURE: CPT | Performed by: INTERNAL MEDICINE

## 2025-03-13 PROCEDURE — 86140 C-REACTIVE PROTEIN: CPT | Performed by: INTERNAL MEDICINE

## 2025-03-13 PROCEDURE — 87340 HEPATITIS B SURFACE AG IA: CPT | Performed by: INTERNAL MEDICINE

## 2025-03-13 PROCEDURE — 82306 VITAMIN D 25 HYDROXY: CPT | Performed by: INTERNAL MEDICINE

## 2025-03-13 PROCEDURE — 99214 OFFICE O/P EST MOD 30 MIN: CPT | Mod: S$GLB,,, | Performed by: INTERNAL MEDICINE

## 2025-03-13 PROCEDURE — 3078F DIAST BP <80 MM HG: CPT | Mod: CPTII,S$GLB,, | Performed by: INTERNAL MEDICINE

## 2025-03-13 PROCEDURE — 3008F BODY MASS INDEX DOCD: CPT | Mod: CPTII,S$GLB,, | Performed by: INTERNAL MEDICINE

## 2025-03-13 PROCEDURE — 82607 VITAMIN B-12: CPT | Performed by: INTERNAL MEDICINE

## 2025-03-13 PROCEDURE — 86704 HEP B CORE ANTIBODY TOTAL: CPT | Performed by: INTERNAL MEDICINE

## 2025-03-13 PROCEDURE — 80053 COMPREHEN METABOLIC PANEL: CPT | Performed by: INTERNAL MEDICINE

## 2025-03-13 RX ORDER — IPRATROPIUM BROMIDE AND ALBUTEROL SULFATE 2.5; .5 MG/3ML; MG/3ML
3 SOLUTION RESPIRATORY (INHALATION)
OUTPATIENT
Start: 2025-03-13

## 2025-03-13 RX ORDER — DIPHENHYDRAMINE HYDROCHLORIDE 50 MG/ML
25 INJECTION, SOLUTION INTRAMUSCULAR; INTRAVENOUS
OUTPATIENT
Start: 2025-03-13

## 2025-03-13 RX ORDER — HEPARIN 100 UNIT/ML
500 SYRINGE INTRAVENOUS
OUTPATIENT
Start: 2025-03-13

## 2025-03-13 RX ORDER — ACETAMINOPHEN 325 MG/1
650 TABLET ORAL
OUTPATIENT
Start: 2025-03-13

## 2025-03-13 RX ORDER — SODIUM CHLORIDE 0.9 % (FLUSH) 0.9 %
10 SYRINGE (ML) INJECTION
OUTPATIENT
Start: 2025-03-13

## 2025-03-13 RX ORDER — METHYLPREDNISOLONE SOD SUCC 125 MG
40 VIAL (EA) INJECTION
OUTPATIENT
Start: 2025-03-13

## 2025-03-13 RX ORDER — EPINEPHRINE 1 MG/ML
0.3 INJECTION, SOLUTION, CONCENTRATE INTRAVENOUS
OUTPATIENT
Start: 2025-03-13

## 2025-03-13 NOTE — PROGRESS NOTES
[facility]          Inflammatory Bowel Disease Follow Up Note         TODAY'S VISIT DATE:  3/13/2025    Reason for Consult:    Chief Complaint   Patient presents with    Crohn's Disease       PCP: Klaudia, Primary Doctor      Referring MD:   No ref. provider found    History of Present Illness:  Kt Quispe who is a 29 y.o. male is being seen today at the [facility] on 03/13/2025 for inflammatory bowel disease- Crohn's disease.  He is here today for a follow-up.  He since January he has been noticing an increase in symptoms.  A follow-up stool calprotectin level after increasing his Stelara dosing to every 6 weeks was still very elevated.  Colonoscopy was done on March 7, 2025 and it showed ongoing disease activity throughout the entire colon and in the terminal ileum.  He is here today to discuss ongoing symptoms and further management.  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    IBD History:  In 2023 he underwent a colonoscopy for evaluation of bloody diarrhea.  He was found to have active inflammation throughout the entire colon and in the terminal ileum.  Biopsies were consistent with Crohn's disease.  He also had a perianal abscess in 2022 that was drained.  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  His baseline stool calprotectin was elevated at over 300.  He also had an elevated CRP.  He started Stelara on Antoinette 3, 2024.  He was feeling well on Stelara but continued have an elevated calprotectin level and never returned back to baseline.  A follow-up stool calprotectin level remained elevated.  His Stelara level was slightly low at 3.9.  His dose was increased to every 6 week injections.  In spite of that he continued to have an elevated  "calprotectin level and started having more symptoms in early 2025.  A colonoscopy done March 7, 2025 showed ongoing disease activity throughout the entire colon and in the terminal ileum.    IBD Details:  Dx Date:  2023-symptoms ongoing for several years prior to diagnosis  Disease type/distribution:  Crohn's disease/ileum and colon involvement with perianal fistula/abscess in the past  Current Treatment:  Stelara 90 mg every 6 weeks Start Date:  Antoinette 3, 2024 Response:  Not effective  Optimized:  No  Adverse reactions:  None  Prior surgeries:  None  CRP Elevation: Y  calprotectin:  Elevated with active disease  Disease Complications:  Perianal fistula/abscess  Extraintestinal manifestations:  None  Prior treatments:   Steroids:  None  5ASA:  None  IMM: None  TNF Inh: None   Anti-Integrin: None   IL 12/23:  Stelara-not effective in spite of dose optimization  JULIO CESAR Inh: None    Previous Clinical Trials: None    Last Colonoscopy:   March 7, 2024-active inflammation throughout the entire colon and in the terminal ileum  March 2023-active inflammation from the rectum to the cecum, active inflammation in the ileum-biopsies with chronic active inflammation throughout the colon and ileum    Other Endoscopies:  March 2023-EGD-normal EGD.  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    Imaging:   MRE:  None   CT:  Pelvic CT in 2022 with an abscess in the perianal area   Other:  No other pertinent imaging    Pertinent Labs:  Lab Results   Component Value Date    CRP 27.8 (H) 10/28/2024     No results found for: "TTGIGA", "IGA"  No results found for: "TSH", "FREET4"  Lab Results   Component Value Date    BXRDTIMS41ZU 21 (L) 04/24/2024    HIWAZABI06 325 04/24/2024     Lab Results   Component Value Date    HEPBSAG Non-reactive 04/24/2024    HEPBCAB Non-reactive 04/24/2024    HEPCAB Non-reactive 04/24/2024     Lab Results   Component Value Date    BOP54UBAD Negative 07/01/2022     Lab Results   Component Value Date " "   NIL 0.77973 04/24/2024    MITOGENNIL 9.953 04/24/2024     Lab Results   Component Value Date    TPTMINTERP SEE BELOW 04/24/2024     Lab Results   Component Value Date    CDIFFICILEAN Negative 03/09/2023    CDIFFTOX Negative 03/09/2023     Lab Results   Component Value Date    CALPROTECTIN 945.0 (H) 01/10/2025       Therapeutic Drug Monitoring Labs:  No results found for: "PROMETH"  No results found for: "ANSADAINIT", "INFLIXIMAB", "INFLIXINTERP"    Vaccinations:  No results found for: "HEPBSAB"  Lab Results   Component Value Date    HEPAIGG Reactive 04/24/2024     Lab Results   Component Value Date    VARICELLAZOS 189.40 04/24/2024    VARICELLAINT Positive 04/24/2024     Immunization History   Administered Date(s) Administered    COVID-19, MRNA, LN-S, PF (MODERNA FULL 0.5 ML DOSE) 01/06/2021, 02/04/2021, 12/11/2021    COVID-19, MRNA, LN-S, PF (Pfizer) (Purple Cap) 01/06/2021, 02/04/2021, 12/11/2021    COVID-19, mRNA, LNP-S, bivalent booster, PF (Moderna Omicron)12 + YEARS 09/17/2022    Hepatitis A, Pediatric/Adolescent, 2 Dose 11/09/2012, 11/12/2013    Hepatitis B 1995, 1995, 03/29/1997, 04/16/2004    IPV 09/20/2001    Influenza 09/20/2022    Influenza - Quadrivalent - PF *Preferred* (6 months and older) 09/16/2022, 09/04/2023    MMR 09/20/2001, 08/17/2002    Meningococcal Conjugate (MCV4P) 10/13/2008, 11/12/2013    OPV 1995, 1995, 1995, 06/28/1998    Tdap 10/13/2008    Varicella 04/05/2001, 08/31/2010         Review of Systems  Review of Systems   Constitutional:  Negative for chills, fever and weight loss.   HENT:  Negative for sore throat.    Eyes:  Negative for pain, discharge and redness.   Respiratory:  Negative for cough, shortness of breath and wheezing.    Cardiovascular:  Negative for chest pain and leg swelling.   Gastrointestinal:  Positive for blood in stool and diarrhea. Negative for abdominal pain, constipation, heartburn, melena, nausea and vomiting.   Genitourinary:  " Negative for dysuria and frequency.   Musculoskeletal:  Negative for back pain, joint pain and myalgias.   Skin:  Negative for rash.   Neurological:  Negative for focal weakness and seizures.   Endo/Heme/Allergies:  Does not bruise/bleed easily.   Psychiatric/Behavioral:  Negative for depression. The patient is not nervous/anxious.        Medical History:   Past Medical History:   Diagnosis Date    Chronic diarrhea     Food intolerance     Inflammatory bowel disease        Surgical History:  Past Surgical History:   Procedure Laterality Date    COLONOSCOPY N/A 3/21/2023    Procedure: COLONOSCOPY;  Surgeon: Ulysses Holcomb MD;  Location: [facility] ENDOSCOPY;  Service: Endoscopy;  Laterality: N/A;    COLONOSCOPY N/A 3/7/2025    Procedure: COLONOSCOPY;  Surgeon: Madhav Fletcher MD;  Location: [address]);  Service: Endoscopy;  Laterality: N/A;  Justine / PEG / inst portal - LW  2/27 - precall complete - [place]    ESOPHAGOGASTRODUODENOSCOPY N/A 3/21/2023    Procedure: EGD (ESOPHAGOGASTRODUODENOSCOPY);  Surgeon: Ulysses Holcomb MD;  Location: [facility] ENDOSCOPY;  Service: Endoscopy;  Laterality: N/A;  instr via email  sched last case due to pending c.diff labs; if negative pt can be moved to earlier time; A  3/20 pre call complete, pt stated he would have a ride -CE       Family History:   Family History   Problem Relation Name Age of Onset    Crohn's disease Brother         Social History:   Social History     Tobacco Use    Smoking status: Never    Smokeless tobacco: Never   Substance Use Topics    Alcohol use: Not Currently    Drug use: Never       Allergies: Reviewed    Home Medications:   Medication List with Changes/Refills   New Medications    VEDOLIZUMAB 108 MG/0.68 ML PNIJ    Inject 108 mg into the skin every 14 (fourteen) days.   Current Medications    AZELASTINE 205.5 MCG (0.15 %) SPRY    Use 1-2 sprays in each nostril twice daily    FLUTICASONE PROPIONATE (FLONASE) 50 MCG/ACTUATION NASAL SPRAY    Use  "1-2 sprays in each nostril twice daily    OLOPATADINE (PATADAY ONCE DAILY RELIEF) 0.2 % DROP    Place 1 drop into both eyes daily as needed (for itchy or watery eyes).    STELARA 90 MG/ML SYRG SYRINGE    INJECT 1 SYRINGE SUBCUTANEOUSLY  EVERY 6 WEEKS       Physical Exam:  Vital Signs:  /76 (BP Location: Left arm, Patient Position: Sitting)   Pulse 72   Temp 98.8 °F (37.1 °C) (Temporal)   Ht 5' 10" (1.778 m)   Wt 82.7 kg (182 lb 5.1 oz)   SpO2 98%   BMI 26.16 kg/m²   Body mass index is 26.16 kg/m².    Physical Exam  Nursing note reviewed.   Constitutional:       General: He is not in acute distress.     Appearance: Normal appearance. He is well-developed. He is not ill-appearing.   Skin:     Findings: No rash.   Neurological:      Mental Status: He is alert.   Psychiatric:         Mood and Affect: Mood normal.         Behavior: Behavior normal.         Thought Content: Thought content normal.         Judgment: Judgment normal.         Labs: reviewed and pertinent noted above    Assessment/Plan:  Kt Quispe is a 29 y.o. male with Crohn's disease of the small bowel and colon complicated by perianal abscess/fistula in the past. The following issues were addresssed:    1. Crohn's disease of both small and large intestine with fistula    2. Immunosuppression due to drug therapy      1. Crohn's disease:  Is recent colonoscopy showed ongoing disease activity in spite of dose optimization of Stelara.  Today we discussed a few different treatment options.  We discussed the use of Skyrizi or potentially Tremfya when this is approved later this.  We also discussed Entyvio and Humira.  An injectable medication would be his preference due to his work schedule.  He is willing to proceed with a few IV induction doses if needed.  After discussion of risks and benefits he would prefer to try Entyvio given the improved safety profile.  He does have concerns over lymphoma risk associated with TNF inhibitors but if we can " not get approval for Entyvio injectable after 2 induction IV doses he is open to trying Humira.    2. Immunosuppression:  Recommend annual flu shot.  We will discuss other vaccinations in the future.       # IBD specific health maintenance:  Colon cancer surveillance:  Up-to-date    Annual:  - Eye exam:  Not applicable  - Skin exam (if on IMM/TNF):  Up-to-date-recommend annual skin exam  - reminded pt to use sunblock/hats/sunprotective clothing  - PAP (if immunosuppressed):  Not applicable    DEXA:  Not applicable    Vitamin D:  Previously low-discuss supplementation in the future.    Vaccines:    Influenza:  Recommend annual vaccination   Pneumovax:  Review in the future   HAV:  Immune   HBV:  Immune   Tdap:  Discuss in the future   MMR:  Immune   VZV:  Immune   HZV:  Discuss in the future   HPV:  Discuss in the future   Meningococcus:  Review in the future   COVID: Vaccinated    Follow up: Follow up in about 6 months (around 9/13/2025).    Visit today is associated with current or anticipated ongoing medical care related to this patient's single serious condition/complex condition (Crohn's disease).      Thank you again for sending Kt Quispe to see Dr. Cristian Fletcher today at the [facility]. Please don't hesitate to contact Dr. Fletcher if there are any questions regarding this evaluation, or if you have any other patients with inflammatory bowel disease for whom you would like a consultation. You can reach Dr. Fletcher at [phone] or by email at [email or web address]    Madhav Fletcher MD  Department of Gastroenterology  Inflammatory Bowel Disease

## 2025-03-14 ENCOUNTER — RESULTS FOLLOW-UP (OUTPATIENT)
Dept: GASTROENTEROLOGY | Facility: HOSPITAL | Age: 30
End: 2025-03-14

## 2025-03-17 LAB
GAMMA INTERFERON BACKGROUND BLD IA-ACNC: 0.06 IU/ML
HBV SURFACE AB SER QL IA: POSITIVE
HBV SURFACE AB SERPL IA-ACNC: NORMAL MIU/ML
M TB IFN-G CD4+ BCKGRND COR BLD-ACNC: 0 IU/ML
M TB IFN-G CD4+ BCKGRND COR BLD-ACNC: 0 IU/ML
MITOGEN IGNF BCKGRD COR BLD-ACNC: 9.94 IU/ML
TB GOLD PLUS: NEGATIVE

## 2025-03-24 ENCOUNTER — TELEPHONE (OUTPATIENT)
Dept: GASTROENTEROLOGY | Facility: CLINIC | Age: 30
End: 2025-03-24
Payer: COMMERCIAL

## 2025-03-24 NOTE — TELEPHONE ENCOUNTER
Letter of medical necessity and supporting documents (clinical notes, labs, etc) faxed to [facility]/[employer]. Successful fax transmittal e-mail received.

## 2025-03-31 ENCOUNTER — TELEPHONE (OUTPATIENT)
Dept: GASTROENTEROLOGY | Facility: CLINIC | Age: 30
End: 2025-03-31
Payer: COMMERCIAL

## 2025-03-31 NOTE — TELEPHONE ENCOUNTER
----- Message from Pharmacist Rachel sent at 3/31/2025 11:45 AM CDT -----  I submitted an urgent appeal for entyvio IV last Monday and have not heard back yet from them. Can one of you please contact his insurance to get an update on the status of the appeal?Thank you,Rachel

## 2025-03-31 NOTE — TELEPHONE ENCOUNTER
Contacted [employer] ([phone]) for status update on urgent appeal submitted 3/24 for Entyvio IV. Spoke [facility] rep Mckenzie.    [facility] confirmed Entyvio IV appeal status:     Appeal approved  as of 3/25/2025  Case ID: [id number]    Rep says she will fax approval letter to the to the confirmed fax # provided ([phone])  Call ref:[id number]

## 2025-04-05 DIAGNOSIS — K50.813 CROHN'S DISEASE OF BOTH SMALL AND LARGE INTESTINE WITH FISTULA: ICD-10-CM

## 2025-04-06 ENCOUNTER — PATIENT MESSAGE (OUTPATIENT)
Dept: GASTROENTEROLOGY | Facility: CLINIC | Age: 30
End: 2025-04-06
Payer: COMMERCIAL

## 2025-04-07 RX ORDER — USTEKINUMAB 90 MG/ML
INJECTION, SOLUTION SUBCUTANEOUS
Qty: 1 ML | Refills: 1 | OUTPATIENT
Start: 2025-04-07

## 2025-04-07 NOTE — TELEPHONE ENCOUNTER
"Primary provider: Dr. Madhav Fletcher    IBD medications:   Stop Stelara    Start Entyvio when approved    Refill request for:      Pended Medication(s)   Requested Prescriptions     Pending Prescriptions Disp Refills    STELARA 90 mg/mL Syrg syringe [Pharmacy Med Name: STELARA  90MG PREFILLED SYRINGE  ML PFS] 1 mL 1     Sig: INJECT 1 SYRINGE SUBCUTANEOUSLY  EVERY 6 WEEKS           Allergies reviewed: Yes    Drug Monitoring labs/frequency for all IBD meds:    CBC and CMP every 6 months  TB and Hep B every 12 months    Lab Results   Component Value Date    HEPBSAG Non-reactive 03/13/2025    HEPBCAB Non-reactive 03/13/2025     Lab Results   Component Value Date    TBGOLDPLUS Negative 03/13/2025     No results found for: "QUANTNILVALU", "QUANTIFERON", "QUANTTBGDPL"   No results found for: "TSPOTSCREN"  Lab Results   Component Value Date    MYTVMBPN55WC 17 (L) 03/13/2025    WGAJKUSP13 366 03/13/2025     Lab Results   Component Value Date    WBC 7.82 03/13/2025    HGB 13.5 (L) 03/13/2025    HCT 41.6 03/13/2025    MCV 82 03/13/2025     03/13/2025     Lab Results   Component Value Date    CREATININE 0.9 03/13/2025    ALBUMIN 3.7 03/13/2025    BILITOT 0.5 03/13/2025    ALKPHOS 96 03/13/2025    AST 16 03/13/2025    ALT 14 03/13/2025     No results found for: "CHOL", "HDL", "LDLCALC", "TRIG"    Lab due date (mo/yr):  9/2025    Labs scheduled: No - but patient has an OV before or when labs are due     Next appt: 9/25/25    RX refill sent to provider for amount until next labs: No-Stelara discontinued       "

## 2025-04-19 ENCOUNTER — PATIENT MESSAGE (OUTPATIENT)
Dept: GASTROENTEROLOGY | Facility: CLINIC | Age: 30
End: 2025-04-19
Payer: COMMERCIAL

## 2025-04-24 ENCOUNTER — TELEPHONE (OUTPATIENT)
Dept: GASTROENTEROLOGY | Facility: CLINIC | Age: 30
End: 2025-04-24
Payer: COMMERCIAL

## 2025-05-14 ENCOUNTER — PATIENT MESSAGE (OUTPATIENT)
Dept: GASTROENTEROLOGY | Facility: CLINIC | Age: 30
End: 2025-05-14
Payer: COMMERCIAL

## 2025-05-14 NOTE — TELEPHONE ENCOUNTER
Currently on Entyvio 108 mg SC every 2 weeks (started IV on 4/17/25, second IV and LD 5/1, ND and first SC on 5/29)

## 2025-07-30 ENCOUNTER — PATIENT MESSAGE (OUTPATIENT)
Dept: GASTROENTEROLOGY | Facility: CLINIC | Age: 30
End: 2025-07-30
Payer: COMMERCIAL

## 2025-08-13 ENCOUNTER — LAB VISIT (OUTPATIENT)
Dept: LAB | Facility: HOSPITAL | Age: 30
End: 2025-08-13
Attending: INTERNAL MEDICINE
Payer: COMMERCIAL

## 2025-08-13 DIAGNOSIS — K50.813 CROHN'S DISEASE OF BOTH SMALL AND LARGE INTESTINE WITH FISTULA: ICD-10-CM

## 2025-08-13 PROCEDURE — 83993 ASSAY FOR CALPROTECTIN FECAL: CPT

## 2025-08-14 LAB
CALPROTECTIN INTERP (OHS): ABNORMAL
CALPROTECTIN STOOL (OHS): 454 ΜG/G

## 2025-08-18 ENCOUNTER — TELEPHONE (OUTPATIENT)
Dept: GASTROENTEROLOGY | Facility: CLINIC | Age: 30
End: 2025-08-18
Payer: COMMERCIAL

## 2025-08-18 DIAGNOSIS — K50.813 CROHN'S DISEASE OF BOTH SMALL AND LARGE INTESTINE WITH FISTULA: Primary | ICD-10-CM
